# Patient Record
(demographics unavailable — no encounter records)

---

## 2024-10-28 NOTE — REVIEW OF SYSTEMS
[Diarrhea: Grade 0] : Diarrhea: Grade 0 [Muscle Weakness] : muscle weakness [Negative] : Allergic/Immunologic

## 2024-10-28 NOTE — HISTORY OF PRESENT ILLNESS
[Disease: _____________________] : Disease: [unfilled] [AJCC Stage: ____] : AJCC Stage: [unfilled] [Therapy: ___] : Therapy: [unfilled] [de-identified] : 68 M with poor medical follow up, HTN, untreated Hepatitis C presents for management of newly diagnosed cholagiocarcinoma, FGFR2 fusion.   Hong presented to [facility] on 8/10/23 with UTI symptoms, FTT with significant weight loss.  He repors 2-3 mos of progressive weakness, unintentional weight loss of 60 lbs in 2-3 yrs.  CT CAP 8/10/23 showed Large and ill-defined hepatic hypodensity involving a large portion of the right hepatic lobe, retroperitoneal lymphadenopathy. MRI Abd on 8/11/23 with findings are suspicious for multifocal intrahepatic cholangiocarcinoma, with the dominant mass in the right hepatic lobe measuring up to 11.8 cm. Retroperitoneal, periportal and gastric hepatic lymphadenopathy is favored to be metastatic, IR performed liver biopsy 8/14/23 c/w adenocarcinoma, moderately differentiated, IHC stains + CK7,  CK19. Patient found to be + hepatitis C.   PET/CT on 8/22/23  showed FDG-avid dominant lesion in right hepatic lobe and multiple small FDG-avid bilobar hepatic lesions correspond to biopsy-proven malignancy. FDG-avid retroperitoneal and inferior preesophageal lymphadenopathy. FDG-avid focus in upper rectum with questionable corresponding 2 cm soft tissue density on CT, is concerning for neoplasm. Nonspecific, mild hypermetabolism in fundus of stomach may reflect inflammation.   Referred to medical oncology.   9/8/23: Colonoscopy: large rectal polyp resected, benign 9/11/23: C1D1 Gemzar/Cisplatin/Durvalumab  9/18/23: C1D8 10/2/23:C2D1 10/9/23: C2D8 c/b cisplatin rxn with back pain, radiating to chest, hypertensive. Reaction occurred after Cisplatin infusion. Cisplatin d/c'ed 10/19/23: CT CAP: no change in hepatic lesions and RP nodes 10/23/23: C3D1 [place]/Durva alone 10/30/23 C3D8 discussed with pt, concern that Gemzar/Durva alone will not be enough to control disease. Recommendation to add Abraxane. Will d/c Durvalumab as no benefit known with combining with Abraxane.  11/13-1/22/24: C1-3D15 Gemzar/Abraxane 12/28/23: CT C/A/P - stable hepatic lesions and RP LAD 1/29/24: EGD: portal gastropathy 2/5-2/9/14: Admitted to [facility] with melena (2/5 tx held), found to have duodenal variceal bleed s/p obliteration. CT A/P 2/5/24 noted continued liver mass with satellite lesions, difficult to compare to prior scan 2/16/24: US Para: minimal ascites, 50 cc removed c/w portal HTN, neg cytology  3/4/24: resumed Gemzar 800 mg/m2 + Abraxane 100 mg/m2, C4D1 3/18/24: C4D15 4/1/24: C5D1 4/8/24: CT CAP: SD 4/15/24: C5D15 4/29/24: C6D1 5/31/24: CT CAP: incidental RUL segmental PE. Large infiltrative right hepatic lobe mass grossly stable. Multiple satellite nodules in the liver + periportal & retroperitoneal LAD stable. Increasing GB distension w/ increased RUQ & pericholecystic fluid.  5/31/24-6/2/24: Admitted for management of PE & GB distension. Started heparin > eliquis. TTE negative for R heart strain. Per surgery, no surgical intervention, treat w/ ABX  6/3/24: Started Futibatinib 12 mg 6/24/24: Increased Futibatinib 16 mg 7/23/24: futibatinib on hold d/t symptomatic hypotension  7/31/24: resumed futibatinib 12 mg po QD 8/29/24: CT CAP: interval decrease in overall tumor burden with right hepatic lobe, interval decrease in LAD. No new sites of diseas  [de-identified] : adenocarcinoma, moderately differentiated  [de-identified] : CHIO TMB 1   FGFR2 FGFR2-SORBS1 fusion IDH2 [id number] CBL C416S - subclonal SETD2 [id number] TERT promoter -[id number]> [de-identified] : Will be starting acupuncture and red light therapy  has been doing well  has been taking spirinolactone. Has not restarted lasix

## 2024-11-10 NOTE — REVIEW OF SYSTEMS
[Feeling Poorly] : feeling poorly [Recent Weight Loss (___ Lbs)] : recent [unfilled] ~Ulb weight loss [As Noted in HPI] : as noted in HPI [Negative] : Endocrine [Fever] : no fever [Chills] : no chills

## 2024-11-10 NOTE — ASSESSMENT
[FreeTextEntry1] : Mr. MARY KEARNS a 69-year-old  male, with poor medical follow-up, HTN, and untreated Hepatitis C, was recently diagnosed with cholangiocarcinoma by Dr. Hilda Kiser. MRI (August 11, 2023) found an 11.8 cm dominant mass in the right hepatic lobe, potential metastasis. Liver biopsy (August 14, 2023) confirmed moderately differentiated adenocarcinoma (CK7 and CK19 positive). PET/CT (August 22, 2023) showed FDG-avid lesions in the liver, lymph nodes, and upper rectum, with mild stomach fundus hypermetabolism. AFP <1.8 on August 10, 2023. The patient received a combination of Gemzar, Cisplatin, and Durvalumab initially, followed by Gemzar/Durvalumab alone, and later Gemzar/Abraxane due to concerns about disease control. Durvalumab was discontinued, and stable hepatic lesions were observed on subsequent scans, though a duodenal variceal bleed occurred during treatment. Course has been further complicated with ascites, s/p LVP x 1. Ascites now well controlled on diuretics. He feels well. He was started on Epclusa in Oct 2024 for HCV treatment.   PLAN # Chronic Hepatitis C: - I have discussed with him at length regarding hepatitis C. I reviewed the natural history, modes of transmission, evaluation, prognosis, and possible treatment. Genotype 2.  -Given improvements in his recent imaging studies, we started Epclusa for 24 week treatment. Will plan for repeat labs with Hep C RNA today and in 3 months before follow up.   #Variceal Screening/Duodenal ulcers -s/p EGD 5/20/2024: There is no endoscopic evidence of esophagitis, ulcerations or varices in the entire esophagus. Mild portal hypertensive gastropathy was found in the gastric fundus and in the gastric body. A large (> 5 mm) varix was found in the proximal second portion of the duodenum. It was 6 mm in diameter. The second distal varix which was injected with glue appears obliterated with no residual ulceration.  Plan for repeat in 1 year.   # Ascites: -Serum-ascites albumin gradient (SAAG) indicates portal hypertension. Ascitic fluid negative for malignant cells (Feb 16, 2024). Mr. KEARNS was counseled to adhere to a low sodium (<2 grams of sodium per day) diet by: avoiding adding any salt to meals (removing the salt shaker from the table); eliminating salty foods from his diet; eating more home-cooked meals; choosing fresh or frozen, not canned, vegetables and fruits; and reading ingredient and food labels to choose low sodium foods. -I have recommended continuing changing Lasix to 20 mg PRN given hypotensive episodes and to continue Aldactone 50 mg BID. Ascites clinically resolved.   #Hypotension -resolved   #Hepatic Encephalopathy -Patient is still taking Lactulose after most recent admission, HE symptoms have improved.  -We reviewed that the patient can self titrate dosing of Lactulose based on constipation symptoms, etc. He reports also taking Miralax and Colace to help treat constipation as well.   #Constipation -Continue Miralax / lactulose   # Multifocal Cholangiocarcinoma with metastases: -Management of the cholangiocarcinoma will be driven Dr. Kiser, Medical Oncologist.  -The patient received a combination of Gemzar, Cisplatin, and Durvalumab initially, followed by Gemzar/Durvalumab alone, and later Gemzar/Abraxane due to concerns about disease control. Durvalumab was discontinued, and stable hepatic lesions were observed on subsequent scans, though a duodenal variceal bleed occurred during treatment, and now treatment plan is being re-assessed. Further he developed ascites, s/p LVP x 1. Ascites now resolved on diuretics. He will follow up with Dr. Kiser with regards to CCA management.  -Recent MRI results showing  1.  Interval decrease in overall tumor burden within the right hepatic lobe. 2.  Interval decrease in lymphadenopathy. 3.  No new sites of disease.  -Per patient Dr. Kiser is ordering imaging studies q3 months.  -He was started on Entecavir to protect against HBV reactivation - this dose was increased due to recent decompensation and he is currently taking 1mg daily.    RTC in 3 months with repeat labs today and before RPA.   The patient was examined, and the treatment plan was reviewed in consultation with Dr. Mao.  Bushra Hensley, MSN, FNP-BC Transplant Hepatology Nurse Practitioner [facility] [address] [place] [id number] T: [phone] | F: [phone].

## 2024-11-10 NOTE — END OF VISIT
[] : Fellow [Time Spent: ___ minutes] : I have spent [unfilled] minutes of time on the encounter which excludes teaching and separately reported services. [FreeTextEntry3] : I saw and evaluated the patient with NP Ayden.  I discussed the care of this patient with the NP providing the service, and was directly responsible for the patient's management. My discussion with the NP included the patient's history, physical exam, laboratory findings, and medical decision-making. I agree with the documented findings and plan of care of the NP's note. Spent > 30 minutes collectively in reviewing records, performing patient history and physical examination, and formulating recommendations/plan of care.

## 2024-11-10 NOTE — ASSESSMENT
[FreeTextEntry1] : Mr. MARY KEARNS a 69-year-old  male, with poor medical follow-up, HTN, and untreated Hepatitis C, was recently diagnosed with cholangiocarcinoma by Dr. Hilda Kiser. MRI (August 11, 2023) found an 11.8 cm dominant mass in the right hepatic lobe, potential metastasis. Liver biopsy (August 14, 2023) confirmed moderately differentiated adenocarcinoma (CK7 and CK19 positive). PET/CT (August 22, 2023) showed FDG-avid lesions in the liver, lymph nodes, and upper rectum, with mild stomach fundus hypermetabolism. AFP <1.8 on August 10, 2023. The patient received a combination of Gemzar, Cisplatin, and Durvalumab initially, followed by Gemzar/Durvalumab alone, and later Gemzar/Abraxane due to concerns about disease control. Durvalumab was discontinued, and stable hepatic lesions were observed on subsequent scans, though a duodenal variceal bleed occurred during treatment. Course has been further complicated with ascites, s/p LVP x 1. Ascites now well controlled on diuretics. He feels well. He was started on Epclusa in Oct 2024 for HCV treatment.   PLAN # Chronic Hepatitis C: - I have discussed with him at length regarding hepatitis C. I reviewed the natural history, modes of transmission, evaluation, prognosis, and possible treatment. Genotype 2.  -Given improvements in his recent imaging studies, we started Epclusa for 24 week treatment. Will plan for repeat labs with Hep C RNA today and in 3 months before follow up.   #Variceal Screening/Duodenal ulcers -s/p EGD 5/20/2024: There is no endoscopic evidence of esophagitis, ulcerations or varices in the entire esophagus. Mild portal hypertensive gastropathy was found in the gastric fundus and in the gastric body. A large (> 5 mm) varix was found in the proximal second portion of the duodenum. It was 6 mm in diameter. The second distal varix which was injected with glue appears obliterated with no residual ulceration.  Plan for repeat in 1 year.   # Ascites: -Serum-ascites albumin gradient (SAAG) indicates portal hypertension. Ascitic fluid negative for malignant cells (Feb 16, 2024). Mr. KEARNS was counseled to adhere to a low sodium (<2 grams of sodium per day) diet by: avoiding adding any salt to meals (removing the salt shaker from the table); eliminating salty foods from his diet; eating more home-cooked meals; choosing fresh or frozen, not canned, vegetables and fruits; and reading ingredient and food labels to choose low sodium foods. -I have recommended continuing changing Lasix to 20 mg PRN given hypotensive episodes and to continue Aldactone 50 mg BID. Ascites clinically resolved.   #Hypotension -resolved   #Hepatic Encephalopathy -Patient is still taking Lactulose after most recent admission, HE symptoms have improved.  -We reviewed that the patient can self titrate dosing of Lactulose based on constipation symptoms, etc. He reports also taking Miralax and Colace to help treat constipation as well.   #Constipation -Continue Miralax / lactulose   # Multifocal Cholangiocarcinoma with metastases: -Management of the cholangiocarcinoma will be driven Dr. Kiser, Medical Oncologist.  -The patient received a combination of Gemzar, Cisplatin, and Durvalumab initially, followed by Gemzar/Durvalumab alone, and later Gemzar/Abraxane due to concerns about disease control. Durvalumab was discontinued, and stable hepatic lesions were observed on subsequent scans, though a duodenal variceal bleed occurred during treatment, and now treatment plan is being re-assessed. Further he developed ascites, s/p LVP x 1. Ascites now resolved on diuretics. He will follow up with Dr. Kiser with regards to CCA management.  -Recent MRI results showing  1.  Interval decrease in overall tumor burden within the right hepatic lobe. 2.  Interval decrease in lymphadenopathy. 3.  No new sites of disease.  -Per patient Dr. Kiser is ordering imaging studies q3 months.  -He was started on Entecavir to protect against HBV reactivation - this dose was increased due to recent decompensation and he is currently taking 1mg daily.    RTC in 3 months with repeat labs today and before RPA.   The patient was examined, and the treatment plan was reviewed in consultation with Dr. Mao.  Bushra Hensley, MSN, FNP-BC Transplant Hepatology Nurse Practitioner [facility] [address] [place] [id number] T: [phone] | F: [phone].

## 2024-11-10 NOTE — PHYSICAL EXAM
[General Appearance - Alert] : alert [General Appearance - In No Acute Distress] : in no acute distress [Sclera] : the sclera and conjunctiva were normal [PERRL With Normal Accommodation] : pupils were equal in size, round, and reactive to light [Extraocular Movements] : extraocular movements were intact [Outer Ear] : the ears and nose were normal in appearance [Oropharynx] : the oropharynx was normal [Neck Appearance] : the appearance of the neck was normal [Neck Cervical Mass (___cm)] : no neck mass was observed [Jugular Venous Distention Increased] : there was no jugular-venous distention [Thyroid Diffuse Enlargement] : the thyroid was not enlarged [Thyroid Nodule] : there were no palpable thyroid nodules [] : no respiratory distress [Auscultation Breath Sounds / Voice Sounds] : lungs were clear to auscultation bilaterally [Heart Rate And Rhythm] : heart rate was normal and rhythm regular [Heart Sounds] : normal S1 and S2 [Heart Sounds Gallop] : no gallops [Murmurs] : no murmurs [Heart Sounds Pericardial Friction Rub] : no pericardial rub [Bowel Sounds] : normal bowel sounds [Abdomen Soft] : soft [Abdomen Tenderness] : non-tender [Abdomen Mass (___ Cm)] : no abdominal mass palpated [Abnormal Walk] : normal gait [Nail Clubbing] : no clubbing  or cyanosis of the fingernails [Musculoskeletal - Swelling] : no joint swelling seen [Motor Tone] : muscle strength and tone were normal [Deep Tendon Reflexes (DTR)] : deep tendon reflexes were 2+ and symmetric [Sensation] : the sensory exam was normal to light touch and pinprick [No Focal Deficits] : no focal deficits [Oriented To Time, Place, And Person] : oriented to person, place, and time [Impaired Insight] : insight and judgment were intact [Affect] : the affect was normal [FreeTextEntry1] : Liver 2-3 cm under the RCM, No ascites

## 2024-11-10 NOTE — HISTORY OF PRESENT ILLNESS
[FreeTextEntry1] : Mr. HONG KEARNS a 69 year White male  presents today for  a hepatology appointment. [facility] and has been referred to us by WARREN KISER.  He had a history of poor medical follow-up, hypertension (HTN), and untreated Hepatitis C (incidentally detected, HCV Viral Load: 482475 IU/mL), recently diagnosed with cholangiocarcinoma. Of note, his wife has hepatitis C and was treated a long time ago, and apparently was cured.  Hong's recent medical journey began when he presented to [facility] on August 10, 2023, complaining of symptoms suggestive of a urinary tract infection (UTI). Notably, he had experienced significant weight loss and failure to thrive. He reported a progressive weakening of his overall health over the past 2-3 months, accompanied by an unintentional weight loss of a staggering 60 pounds within the last 2-3 years.  A CT scan of the chest, abdomen, and pelvis conducted on August 10, 2023, revealed concerning findings. There was a large and ill-defined hypodensity involving a substantial portion of the right hepatic lobe, accompanied by retroperitoneal lymphadenopathy.  Further imaging, an MRI of the abdomen on August 11, 2023, raised suspicion of multifocal intrahepatic cholangiocarcinoma, with the dominant mass measuring up to 11.8 cm in the right hepatic lobe. Additionally, there was evidence of retroperitoneal, periportal, and gastric hepatic lymphadenopathy, suggesting possible metastatic spread. To confirm the diagnosis, an interventional radiologist performed a liver biopsy on August 14, 2023, which was consistent with adenocarcinoma, moderately differentiated. Immunohistochemistry (IHC) stains were positive for CK7 and CK19. It was also discovered that Hong had a positive test for hepatitis C.  Subsequently, a PET/CT scan on August 22, 2023, revealed a dominant FDG-avid lesion in the right hepatic lobe, along with multiple smaller FDG-avid bilobar hepatic lesions, corresponding to the biopsy-proven malignancy. FDG-avid retroperitoneal and inferior preesophageal lymphadenopathy were also observed. Furthermore, there was a focus of FDG avidity in the upper rectum, along with a 2 cm soft tissue density on CT, raising concerns about a possible neoplasm. Additionally, there was nonspecific mild hypermetabolism in the fundus of the stomach, which may reflect inflammation.  Tumor Markers: AFP <1.8 on August 10, 2023  A recent flex sigmoidoscopy revealed the presence of polyps, which were biopsied, with pathology results currently pending. He is awaiting a formal full length colonoscopy through his colorectal surgeon.   Feb 21, 2024: Between October 19, 2023, and December 28, 2023, CT scans showed no significant change in the hepatic lesions or lymphadenopathy for this patient with known cholangiocarcinoma. There was also no change in the previously noted splenomegaly. The patient had an elective esophagogastroduodenoscopy (EGD) that revealed no esophageal varices but did show signs of portal hypertensive gastropathy and two small nodules in the duodenum, one of which was biopsied. There was no active bleeding observed immediately after the biopsy. However, about a week later, the patient experienced hematemesis and melena. A follow-up EGD raised suspicions of duodenal varices at the prior biopsy site. A subsequent CT scan with IV contrast confirmed the presence of varices and also detected new ascites. Treatment for the duodenal varices included another EGD, where glue injection successfully controlled the bleeding. Despite this, the patient's ascites increased, leading to a large-volume paracentesis (LVP) on February 16, 2024, during which 2.5 liters of fluid were removed. This procedure provided temporary relief, but the ascites quickly reaccumulated. Fluid analysis and cytology results are pending, but the serum-ascites albumin gradient (SAAG) indicates portal hypertension. I have recommended starting Lasix 40 mg and Aldactone 100 mg, with a follow-up lab test also ordered. We thoroughly discussed his long-term prognosis, considering the complexity of his condition, which likely includes cirrhosis, portal hypertension, decompensation with ascites, ongoing cholangiocarcinoma, and untreated hepatitis C virus (HCV) infection. I have advised a follow up EGD in about 30 days from the last one.  Feb 28, 2024: He returned for a follow-up and is generally feeling better, though he experienced some intermittent abdominal pain and nausea, which have now resolved. Ascites resolved clinically on diuretics.  Dr. Kiser has increased his Protonix dosage to 40 mg twice daily and started him on Famotidine 20 mg twice daily. His labs from February 21, 2024, show stable liver function tests with an AST of 45, ALT of 10, ALP of 142, and bilirubin at 0.5. His hemoglobin level is 10.5, which remains stable, and there has been no interval gastrointestinal bleeding (he denies any occurrence of black stools).  March 21, 2024: Patient returns for a follow up. Doing overall well. No interval GI bleeding. Patient is this is significantly improved with diuretics currently on Lasix 40 mg daily and Aldactone 100 mg daily.  He has resumed Gemzar 800 mg/m along with Abraxane 100 mg/m.  The dose was apparently reduced due to patient's ongoing fatigue and portal hypertension.  Patient has been taking marijuana for symptoms of nausea.  He has been referred by oncology to palliative care for medical marijuana certification to help symptom management.  We reviewed blood test results from March 18, 2024.  This revealed mildly elevated liver test with AST 54, ALT 50, alkaline phosphatase 140 units/L.  Total bili 1.6 mg/dL.  This is relatively stable compared to prior labs.  His CA 19-9 is 9 level is 77 (down from 81).  CBC shows a hemoglobin of 11.1 g/dL which is stable.  Platelet counts are 248,000.  He was scheduled for an upper endoscopy in March 18, 2024 but on the same day chemotherapy was scheduled so the EGD will be rescheduled.  June 11, 2024: Patient returns for routine follow-up. He is s/p hospitalization from 5/31-6/2 for follow up workup for acute cholecystitis based on outpatient CT Abd findings - workup was negative, however he was incidentally found to have a PE. He was initially started on AC management with Lovenox but now transitioned to Eliquis. He was also found to have hepatic encephalopathy on admission and was started on Lactulose. He was transitioned to oral chemo (Futibatinib) this week after discharge. As per the daughter, since starting chemo he has become more constipated despite taking Lactulose as ordered. The patient's daughter reports his last para was on 5/8/24 - removed 1750mL.  He continues to complain of peripheral neuropathy, was previously treated with Cymbalta but caused worsening fatigue. He currently denies abdominal pain, N/V/C/D.   July 15, 2024: Patient returns for routine follow up. He states that since starting Midodrine and recent PHos binder to improve phosphorus he has felt more nauseous, reports decrease in appetite, denies abdominal pain, C/D. BP during office visit today is still borderline hypotension : 101/5. Patient recently called office with c/o hypotension in which Dr Mao initiated Midodrine 5mg TID. No adjustments were made to diuretic regimen: he continues to remain on Furosemide 40mg daily and Spirinolactone 100mg daily.   August 14, 2024:  The patient is here for a follow-up visit and is otherwise doing well with no acute complaints. His previous issues with hypotension have resolved. He recently saw Dr. Kiser, and his labs show a downtrending CA 19-9. Overall, lab results are stable. He is back on his diuretics, Lasix 40 mg daily, and reduced dose of Aldactone 50 mg (down from 100 mg) and Midodrine.   Sept 18, 2024: The patient presents today reporting chemotherapy-related side effects, including pain in his feet and toes due to neuropathy. He also feels that his joint pain has worsened since starting chemotherapy. He denies any active gastrointestinal complaints but mentions ongoing constipation. He is interested in getting his HCV treated.  Nov 6, 2024: The patient returns for a routine follow-up, accompanied by his daughter. He has been on Epclusa for the past month and is tolerating it well. He reports feeling well overall and denies any abdominal pain, nausea, vomiting, constipation, or diarrhea. We reviewed lab results from October 28, which showed normal liver function tests and an improvement in CA 19-9.

## 2024-11-10 NOTE — HISTORY OF PRESENT ILLNESS
[FreeTextEntry1] : Mr. HONG KEARNS a 69 year White male  presents today for  a hepatology appointment. [facility] and has been referred to us by WARREN KISER.  He had a history of poor medical follow-up, hypertension (HTN), and untreated Hepatitis C (incidentally detected, HCV Viral Load: 234695 IU/mL), recently diagnosed with cholangiocarcinoma. Of note, his wife has hepatitis C and was treated a long time ago, and apparently was cured.  Hong's recent medical journey began when he presented to [facility] on August 10, 2023, complaining of symptoms suggestive of a urinary tract infection (UTI). Notably, he had experienced significant weight loss and failure to thrive. He reported a progressive weakening of his overall health over the past 2-3 months, accompanied by an unintentional weight loss of a staggering 60 pounds within the last 2-3 years.  A CT scan of the chest, abdomen, and pelvis conducted on August 10, 2023, revealed concerning findings. There was a large and ill-defined hypodensity involving a substantial portion of the right hepatic lobe, accompanied by retroperitoneal lymphadenopathy.  Further imaging, an MRI of the abdomen on August 11, 2023, raised suspicion of multifocal intrahepatic cholangiocarcinoma, with the dominant mass measuring up to 11.8 cm in the right hepatic lobe. Additionally, there was evidence of retroperitoneal, periportal, and gastric hepatic lymphadenopathy, suggesting possible metastatic spread. To confirm the diagnosis, an interventional radiologist performed a liver biopsy on August 14, 2023, which was consistent with adenocarcinoma, moderately differentiated. Immunohistochemistry (IHC) stains were positive for CK7 and CK19. It was also discovered that Hong had a positive test for hepatitis C.  Subsequently, a PET/CT scan on August 22, 2023, revealed a dominant FDG-avid lesion in the right hepatic lobe, along with multiple smaller FDG-avid bilobar hepatic lesions, corresponding to the biopsy-proven malignancy. FDG-avid retroperitoneal and inferior preesophageal lymphadenopathy were also observed. Furthermore, there was a focus of FDG avidity in the upper rectum, along with a 2 cm soft tissue density on CT, raising concerns about a possible neoplasm. Additionally, there was nonspecific mild hypermetabolism in the fundus of the stomach, which may reflect inflammation.  Tumor Markers: AFP <1.8 on August 10, 2023  A recent flex sigmoidoscopy revealed the presence of polyps, which were biopsied, with pathology results currently pending. He is awaiting a formal full length colonoscopy through his colorectal surgeon.   Feb 21, 2024: Between October 19, 2023, and December 28, 2023, CT scans showed no significant change in the hepatic lesions or lymphadenopathy for this patient with known cholangiocarcinoma. There was also no change in the previously noted splenomegaly. The patient had an elective esophagogastroduodenoscopy (EGD) that revealed no esophageal varices but did show signs of portal hypertensive gastropathy and two small nodules in the duodenum, one of which was biopsied. There was no active bleeding observed immediately after the biopsy. However, about a week later, the patient experienced hematemesis and melena. A follow-up EGD raised suspicions of duodenal varices at the prior biopsy site. A subsequent CT scan with IV contrast confirmed the presence of varices and also detected new ascites. Treatment for the duodenal varices included another EGD, where glue injection successfully controlled the bleeding. Despite this, the patient's ascites increased, leading to a large-volume paracentesis (LVP) on February 16, 2024, during which 2.5 liters of fluid were removed. This procedure provided temporary relief, but the ascites quickly reaccumulated. Fluid analysis and cytology results are pending, but the serum-ascites albumin gradient (SAAG) indicates portal hypertension. I have recommended starting Lasix 40 mg and Aldactone 100 mg, with a follow-up lab test also ordered. We thoroughly discussed his long-term prognosis, considering the complexity of his condition, which likely includes cirrhosis, portal hypertension, decompensation with ascites, ongoing cholangiocarcinoma, and untreated hepatitis C virus (HCV) infection. I have advised a follow up EGD in about 30 days from the last one.  Feb 28, 2024: He returned for a follow-up and is generally feeling better, though he experienced some intermittent abdominal pain and nausea, which have now resolved. Ascites resolved clinically on diuretics.  Dr. Kiser has increased his Protonix dosage to 40 mg twice daily and started him on Famotidine 20 mg twice daily. His labs from February 21, 2024, show stable liver function tests with an AST of 45, ALT of 10, ALP of 142, and bilirubin at 0.5. His hemoglobin level is 10.5, which remains stable, and there has been no interval gastrointestinal bleeding (he denies any occurrence of black stools).  March 21, 2024: Patient returns for a follow up. Doing overall well. No interval GI bleeding. Patient is this is significantly improved with diuretics currently on Lasix 40 mg daily and Aldactone 100 mg daily.  He has resumed Gemzar 800 mg/m along with Abraxane 100 mg/m.  The dose was apparently reduced due to patient's ongoing fatigue and portal hypertension.  Patient has been taking marijuana for symptoms of nausea.  He has been referred by oncology to palliative care for medical marijuana certification to help symptom management.  We reviewed blood test results from March 18, 2024.  This revealed mildly elevated liver test with AST 54, ALT 50, alkaline phosphatase 140 units/L.  Total bili 1.6 mg/dL.  This is relatively stable compared to prior labs.  His CA 19-9 is 9 level is 77 (down from 81).  CBC shows a hemoglobin of 11.1 g/dL which is stable.  Platelet counts are 248,000.  He was scheduled for an upper endoscopy in March 18, 2024 but on the same day chemotherapy was scheduled so the EGD will be rescheduled.  June 11, 2024: Patient returns for routine follow-up. He is s/p hospitalization from 5/31-6/2 for follow up workup for acute cholecystitis based on outpatient CT Abd findings - workup was negative, however he was incidentally found to have a PE. He was initially started on AC management with Lovenox but now transitioned to Eliquis. He was also found to have hepatic encephalopathy on admission and was started on Lactulose. He was transitioned to oral chemo (Futibatinib) this week after discharge. As per the daughter, since starting chemo he has become more constipated despite taking Lactulose as ordered. The patient's daughter reports his last para was on 5/8/24 - removed 1750mL.  He continues to complain of peripheral neuropathy, was previously treated with Cymbalta but caused worsening fatigue. He currently denies abdominal pain, N/V/C/D.   July 15, 2024: Patient returns for routine follow up. He states that since starting Midodrine and recent PHos binder to improve phosphorus he has felt more nauseous, reports decrease in appetite, denies abdominal pain, C/D. BP during office visit today is still borderline hypotension : 101/5. Patient recently called office with c/o hypotension in which Dr Mao initiated Midodrine 5mg TID. No adjustments were made to diuretic regimen: he continues to remain on Furosemide 40mg daily and Spirinolactone 100mg daily.   August 14, 2024:  The patient is here for a follow-up visit and is otherwise doing well with no acute complaints. His previous issues with hypotension have resolved. He recently saw Dr. Kiser, and his labs show a downtrending CA 19-9. Overall, lab results are stable. He is back on his diuretics, Lasix 40 mg daily, and reduced dose of Aldactone 50 mg (down from 100 mg) and Midodrine.   Sept 18, 2024: The patient presents today reporting chemotherapy-related side effects, including pain in his feet and toes due to neuropathy. He also feels that his joint pain has worsened since starting chemotherapy. He denies any active gastrointestinal complaints but mentions ongoing constipation. He is interested in getting his HCV treated.  Nov 6, 2024: The patient returns for a routine follow-up, accompanied by his daughter. He has been on Epclusa for the past month and is tolerating it well. He reports feeling well overall and denies any abdominal pain, nausea, vomiting, constipation, or diarrhea. We reviewed lab results from October 28, which showed normal liver function tests and an improvement in CA 19-9.

## 2024-11-10 NOTE — ASSESSMENT
[FreeTextEntry1] : Mr. MARY KEARNS a 69-year-old  male, with poor medical follow-up, HTN, and untreated Hepatitis C, was recently diagnosed with cholangiocarcinoma by Dr. Hilda Kiser. MRI (August 11, 2023) found an 11.8 cm dominant mass in the right hepatic lobe, potential metastasis. Liver biopsy (August 14, 2023) confirmed moderately differentiated adenocarcinoma (CK7 and CK19 positive). PET/CT (August 22, 2023) showed FDG-avid lesions in the liver, lymph nodes, and upper rectum, with mild stomach fundus hypermetabolism. AFP <1.8 on August 10, 2023. The patient received a combination of Gemzar, Cisplatin, and Durvalumab initially, followed by Gemzar/Durvalumab alone, and later Gemzar/Abraxane due to concerns about disease control. Durvalumab was discontinued, and stable hepatic lesions were observed on subsequent scans, though a duodenal variceal bleed occurred during treatment. Course has been further complicated with ascites, s/p LVP x 1. Ascites now well controlled on diuretics. He feels well. He was started on Epclusa in Oct 2024 for HCV treatment.   PLAN # Chronic Hepatitis C: - I have discussed with him at length regarding hepatitis C. I reviewed the natural history, modes of transmission, evaluation, prognosis, and possible treatment. Genotype 2.  -Given improvements in his recent imaging studies, we started Epclusa for 24 week treatment. Will plan for repeat labs with Hep C RNA today and in 3 months before follow up.   #Variceal Screening/Duodenal ulcers -s/p EGD 5/20/2024: There is no endoscopic evidence of esophagitis, ulcerations or varices in the entire esophagus. Mild portal hypertensive gastropathy was found in the gastric fundus and in the gastric body. A large (> 5 mm) varix was found in the proximal second portion of the duodenum. It was 6 mm in diameter. The second distal varix which was injected with glue appears obliterated with no residual ulceration.  Plan for repeat in 1 year.   # Ascites: -Serum-ascites albumin gradient (SAAG) indicates portal hypertension. Ascitic fluid negative for malignant cells (Feb 16, 2024). Mr. KEARNS was counseled to adhere to a low sodium (<2 grams of sodium per day) diet by: avoiding adding any salt to meals (removing the salt shaker from the table); eliminating salty foods from his diet; eating more home-cooked meals; choosing fresh or frozen, not canned, vegetables and fruits; and reading ingredient and food labels to choose low sodium foods. -I have recommended continuing changing Lasix to 20 mg PRN given hypotensive episodes and to continue Aldactone 50 mg BID. Ascites clinically resolved.   #Hypotension -resolved   #Hepatic Encephalopathy -Patient is still taking Lactulose after most recent admission, HE symptoms have improved.  -We reviewed that the patient can self titrate dosing of Lactulose based on constipation symptoms, etc. He reports also taking Miralax and Colace to help treat constipation as well.   #Constipation -Continue Miralax / lactulose   # Multifocal Cholangiocarcinoma with metastases: -Management of the cholangiocarcinoma will be driven Dr. Kiser, Medical Oncologist.  -The patient received a combination of Gemzar, Cisplatin, and Durvalumab initially, followed by Gemzar/Durvalumab alone, and later Gemzar/Abraxane due to concerns about disease control. Durvalumab was discontinued, and stable hepatic lesions were observed on subsequent scans, though a duodenal variceal bleed occurred during treatment, and now treatment plan is being re-assessed. Further he developed ascites, s/p LVP x 1. Ascites now resolved on diuretics. He will follow up with Dr. Kiser with regards to CCA management.  -Recent MRI results showing  1.  Interval decrease in overall tumor burden within the right hepatic lobe. 2.  Interval decrease in lymphadenopathy. 3.  No new sites of disease.  -Per patient Dr. Kiser is ordering imaging studies q3 months.  -He was started on Entecavir to protect against HBV reactivation - this dose was increased due to recent decompensation and he is currently taking 1mg daily.    RTC in 3 months with repeat labs today and before RPA.   The patient was examined, and the treatment plan was reviewed in consultation with Dr. Mao.  Bushra Hensley, MSN, FNP-BC Transplant Hepatology Nurse Practitioner [facility] [address] [place] [id number] T: [phone] | F: [phone].

## 2024-11-10 NOTE — HISTORY OF PRESENT ILLNESS
[FreeTextEntry1] : Mr. HONG KEARNS a 69 year White male  presents today for  a hepatology appointment. [facility] a [facility] and has been referred to us by WARREN KISER.  He had a history of poor medical follow-up, hypertension (HTN), and untreated Hepatitis C (incidentally detected, HCV Viral Load: 027742 IU/mL), recently diagnosed with cholangiocarcinoma. Of note, his wife has hepatitis C and was treated a long time ago, and apparently was cured.  Hong's recent medical journey began when he presented to [facility] on August 10, 2023, complaining of symptoms suggestive of a urinary tract infection (UTI). Notably, he had experienced significant weight loss and failure to thrive. He reported a progressive weakening of his overall health over the past 2-3 months, accompanied by an unintentional weight loss of a staggering 60 pounds within the last 2-3 years.  A CT scan of the chest, abdomen, and pelvis conducted on August 10, 2023, revealed concerning findings. There was a large and ill-defined hypodensity involving a substantial portion of the right hepatic lobe, accompanied by retroperitoneal lymphadenopathy.  Further imaging, an MRI of the abdomen on August 11, 2023, raised suspicion of multifocal intrahepatic cholangiocarcinoma, with the dominant mass measuring up to 11.8 cm in the right hepatic lobe. Additionally, there was evidence of retroperitoneal, periportal, and gastric hepatic lymphadenopathy, suggesting possible metastatic spread. To confirm the diagnosis, an interventional radiologist performed a liver biopsy on August 14, 2023, which was consistent with adenocarcinoma, moderately differentiated. Immunohistochemistry (IHC) stains were positive for CK7 and CK19. It was also discovered that Hong had a positive test for hepatitis C.  Subsequently, a PET/CT scan on August 22, 2023, revealed a dominant FDG-avid lesion in the right hepatic lobe, along with multiple smaller FDG-avid bilobar hepatic lesions, corresponding to the biopsy-proven malignancy. FDG-avid retroperitoneal and inferior preesophageal lymphadenopathy were also observed. Furthermore, there was a focus of FDG avidity in the upper rectum, along with a 2 cm soft tissue density on CT, raising concerns about a possible neoplasm. Additionally, there was nonspecific mild hypermetabolism in the fundus of the stomach, which may reflect inflammation.  Tumor Markers: AFP <1.8 on August 10, 2023  A recent flex sigmoidoscopy revealed the presence of polyps, which were biopsied, with pathology results currently pending. He is awaiting a formal full length colonoscopy through his colorectal surgeon.   Feb 21, 2024: Between October 19, 2023, and December 28, 2023, CT scans showed no significant change in the hepatic lesions or lymphadenopathy for this patient with known cholangiocarcinoma. There was also no change in the previously noted splenomegaly. The patient had an elective esophagogastroduodenoscopy (EGD) that revealed no esophageal varices but did show signs of portal hypertensive gastropathy and two small nodules in the duodenum, one of which was biopsied. There was no active bleeding observed immediately after the biopsy. However, about a week later, the patient experienced hematemesis and melena. A follow-up EGD raised suspicions of duodenal varices at the prior biopsy site. A subsequent CT scan with IV contrast confirmed the presence of varices and also detected new ascites. Treatment for the duodenal varices included another EGD, where glue injection successfully controlled the bleeding. Despite this, the patient's ascites increased, leading to a large-volume paracentesis (LVP) on February 16, 2024, during which 2.5 liters of fluid were removed. This procedure provided temporary relief, but the ascites quickly reaccumulated. Fluid analysis and cytology results are pending, but the serum-ascites albumin gradient (SAAG) indicates portal hypertension. I have recommended starting Lasix 40 mg and Aldactone 100 mg, with a follow-up lab test also ordered. We thoroughly discussed his long-term prognosis, considering the complexity of his condition, which likely includes cirrhosis, portal hypertension, decompensation with ascites, ongoing cholangiocarcinoma, and untreated hepatitis C virus (HCV) infection. I have advised a follow up EGD in about 30 days from the last one.  Feb 28, 2024: He returned for a follow-up and is generally feeling better, though he experienced some intermittent abdominal pain and nausea, which have now resolved. Ascites resolved clinically on diuretics.  Dr. Kiser has increased his Protonix dosage to 40 mg twice daily and started him on Famotidine 20 mg twice daily. His labs from February 21, 2024, show stable liver function tests with an AST of 45, ALT of 10, ALP of 142, and bilirubin at 0.5. His hemoglobin level is 10.5, which remains stable, and there has been no interval gastrointestinal bleeding (he denies any occurrence of black stools).  March 21, 2024: Patient returns for a follow up. Doing overall well. No interval GI bleeding. Patient is this is significantly improved with diuretics currently on Lasix 40 mg daily and Aldactone 100 mg daily.  He has resumed Gemzar 800 mg/m along with Abraxane 100 mg/m.  The dose was apparently reduced due to patient's ongoing fatigue and portal hypertension.  Patient has been taking marijuana for symptoms of nausea.  He has been referred by oncology to palliative care for medical marijuana certification to help symptom management.  We reviewed blood test results from March 18, 2024.  This revealed mildly elevated liver test with AST 54, ALT 50, alkaline phosphatase 140 units/L.  Total bili 1.6 mg/dL.  This is relatively stable compared to prior labs.  His CA 19-9 is 9 level is 77 (down from 81).  CBC shows a hemoglobin of 11.1 g/dL which is stable.  Platelet counts are 248,000.  He was scheduled for an upper endoscopy in March 18, 2024 but on the same day chemotherapy was scheduled so the EGD will be rescheduled.  June 11, 2024: Patient returns for routine follow-up. He is s/p hospitalization from 5/31-6/2 for follow up workup for acute cholecystitis based on outpatient CT Abd findings - workup was negative, however he was incidentally found to have a PE. He was initially started on AC management with Lovenox but now transitioned to Eliquis. He was also found to have hepatic encephalopathy on admission and was started on Lactulose. He was transitioned to oral chemo (Futibatinib) this week after discharge. As per the daughter, since starting chemo he has become more constipated despite taking Lactulose as ordered. The patient's daughter reports his last para was on 5/8/24 - removed 1750mL.  He continues to complain of peripheral neuropathy, was previously treated with Cymbalta but caused worsening fatigue. He currently denies abdominal pain, N/V/C/D.   July 15, 2024: Patient returns for routine follow up. He states that since starting Midodrine and recent PHos binder to improve phosphorus he has felt more nauseous, reports decrease in appetite, denies abdominal pain, C/D. BP during office visit today is still borderline hypotension : 101/5. Patient recently called office with c/o hypotension in which Dr Mao initiated Midodrine 5mg TID. No adjustments were made to diuretic regimen: he continues to remain on Furosemide 40mg daily and Spirinolactone 100mg daily.   August 14, 2024:  The patient is here for a follow-up visit and is otherwise doing well with no acute complaints. His previous issues with hypotension have resolved. He recently saw Dr. Kiser, and his labs show a downtrending CA 19-9. Overall, lab results are stable. He is back on his diuretics, Lasix 40 mg daily, and reduced dose of Aldactone 50 mg (down from 100 mg) and Midodrine.   Sept 18, 2024: The patient presents today reporting chemotherapy-related side effects, including pain in his feet and toes due to neuropathy. He also feels that his joint pain has worsened since starting chemotherapy. He denies any active gastrointestinal complaints but mentions ongoing constipation. He is interested in getting his HCV treated.  Nov 6, 2024: The patient returns for a routine follow-up, accompanied by his daughter. He has been on Epclusa for the past month and is tolerating it well. He reports feeling well overall and denies any abdominal pain, nausea, vomiting, constipation, or diarrhea. We reviewed lab results from October 28, which showed normal liver function tests and an improvement in CA 19-9.

## 2024-11-10 NOTE — ASSESSMENT
[FreeTextEntry1] : Mr. MARY KEARNS a 69-year-old  male, with poor medical follow-up, HTN, and untreated Hepatitis C, was recently diagnosed with cholangiocarcinoma by Dr. Hilda Kiser. MRI (August 11, 2023) found an 11.8 cm dominant mass in the right hepatic lobe, potential metastasis. Liver biopsy (August 14, 2023) confirmed moderately differentiated adenocarcinoma (CK7 and CK19 positive). PET/CT (August 22, 2023) showed FDG-avid lesions in the liver, lymph nodes, and upper rectum, with mild stomach fundus hypermetabolism. AFP <1.8 on August 10, 2023. The patient received a combination of Gemzar, Cisplatin, and Durvalumab initially, followed by Gemzar/Durvalumab alone, and later Gemzar/Abraxane due to concerns about disease control. Durvalumab was discontinued, and stable hepatic lesions were observed on subsequent scans, though a duodenal variceal bleed occurred during treatment. Course has been further complicated with ascites, s/p LVP x 1. Ascites now well controlled on diuretics. He feels well. He was started on Epclusa in Oct 2024 for HCV treatment.   PLAN # Chronic Hepatitis C: - I have discussed with him at length regarding hepatitis C. I reviewed the natural history, modes of transmission, evaluation, prognosis, and possible treatment. Genotype 2.  -Given improvements in his recent imaging studies, we started Epclusa for 24 week treatment. Will plan for repeat labs with Hep C RNA today and in 3 months before follow up.   #Variceal Screening/Duodenal ulcers -s/p EGD 5/20/2024: There is no endoscopic evidence of esophagitis, ulcerations or varices in the entire esophagus. Mild portal hypertensive gastropathy was found in the gastric fundus and in the gastric body. A large (> 5 mm) varix was found in the proximal second portion of the duodenum. It was 6 mm in diameter. The second distal varix which was injected with glue appears obliterated with no residual ulceration.  Plan for repeat in 1 year.   # Ascites: -Serum-ascites albumin gradient (SAAG) indicates portal hypertension. Ascitic fluid negative for malignant cells (Feb 16, 2024). Mr. KEARNS was counseled to adhere to a low sodium (<2 grams of sodium per day) diet by: avoiding adding any salt to meals (removing the salt shaker from the table); eliminating salty foods from his diet; eating more home-cooked meals; choosing fresh or frozen, not canned, vegetables and fruits; and reading ingredient and food labels to choose low sodium foods. -I have recommended continuing changing Lasix to 20 mg PRN given hypotensive episodes and to continue Aldactone 50 mg BID. Ascites clinically resolved.   #Hypotension -resolved   #Hepatic Encephalopathy -Patient is still taking Lactulose after most recent admission, HE symptoms have improved.  -We reviewed that the patient can self titrate dosing of Lactulose based on constipation symptoms, etc. He reports also taking Miralax and Colace to help treat constipation as well.   #Constipation -Continue Miralax / lactulose   # Multifocal Cholangiocarcinoma with metastases: -Management of the cholangiocarcinoma will be driven Dr. Kiser, Medical Oncologist.  -The patient received a combination of Gemzar, Cisplatin, and Durvalumab initially, followed by Gemzar/Durvalumab alone, and later Gemzar/Abraxane due to concerns about disease control. Durvalumab was discontinued, and stable hepatic lesions were observed on subsequent scans, though a duodenal variceal bleed occurred during treatment, and now treatment plan is being re-assessed. Further he developed ascites, s/p LVP x 1. Ascites now resolved on diuretics. He will follow up with Dr. Kiser with regards to CCA management.  -Recent MRI results showing  1.  Interval decrease in overall tumor burden within the right hepatic lobe. 2.  Interval decrease in lymphadenopathy. 3.  No new sites of disease.  -Per patient Dr. Kiser is ordering imaging studies q3 months.  -He was started on Entecavir to protect against HBV reactivation - this dose was increased due to recent decompensation and he is currently taking 1mg daily.    RTC in 3 months with repeat labs today and before RPA.   The patient was examined, and the treatment plan was reviewed in consultation with Dr. Mao.  Bushra Hensley, MSN, FNP-BC Transplant Hepatology Nurse Practitioner [facility] [address] [place] [id number] T: [phone] | F: [phone].

## 2024-11-10 NOTE — HISTORY OF PRESENT ILLNESS
[FreeTextEntry1] : Mr. HONG KEARNS a 69 year White male  presents today for  a hepatology appointment. [facility] and has been referred to us by WARREN KISER.  He had a history of poor medical follow-up, hypertension (HTN), and untreated Hepatitis C (incidentally detected, HCV Viral Load: 602436 IU/mL), recently diagnosed with cholangiocarcinoma. Of note, his wife has hepatitis C and was treated a long time ago, and apparently was cured.  Hong's recent medical journey began when he presented to [facility] on August 10, 2023, complaining of symptoms suggestive of a urinary tract infection (UTI). Notably, he had experienced significant weight loss and failure to thrive. He reported a progressive weakening of his overall health over the past 2-3 months, accompanied by an unintentional weight loss of a staggering 60 pounds within the last 2-3 years.  A CT scan of the chest, abdomen, and pelvis conducted on August 10, 2023, revealed concerning findings. There was a large and ill-defined hypodensity involving a substantial portion of the right hepatic lobe, accompanied by retroperitoneal lymphadenopathy.  Further imaging, an MRI of the abdomen on August 11, 2023, raised suspicion of multifocal intrahepatic cholangiocarcinoma, with the dominant mass measuring up to 11.8 cm in the right hepatic lobe. Additionally, there was evidence of retroperitoneal, periportal, and gastric hepatic lymphadenopathy, suggesting possible metastatic spread. To confirm the diagnosis, an interventional radiologist performed a liver biopsy on August 14, 2023, which was consistent with adenocarcinoma, moderately differentiated. Immunohistochemistry (IHC) stains were positive for CK7 and CK19. It was also discovered that Hong had a positive test for hepatitis C.  Subsequently, a PET/CT scan on August 22, 2023, revealed a dominant FDG-avid lesion in the right hepatic lobe, along with multiple smaller FDG-avid bilobar hepatic lesions, corresponding to the biopsy-proven malignancy. FDG-avid retroperitoneal and inferior preesophageal lymphadenopathy were also observed. Furthermore, there was a focus of FDG avidity in the upper rectum, along with a 2 cm soft tissue density on CT, raising concerns about a possible neoplasm. Additionally, there was nonspecific mild hypermetabolism in the fundus of the stomach, which may reflect inflammation.  Tumor Markers: AFP <1.8 on August 10, 2023  A recent flex sigmoidoscopy revealed the presence of polyps, which were biopsied, with pathology results currently pending. He is awaiting a formal full length colonoscopy through his colorectal surgeon.   Feb 21, 2024: Between October 19, 2023, and December 28, 2023, CT scans showed no significant change in the hepatic lesions or lymphadenopathy for this patient with known cholangiocarcinoma. There was also no change in the previously noted splenomegaly. The patient had an elective esophagogastroduodenoscopy (EGD) that revealed no esophageal varices but did show signs of portal hypertensive gastropathy and two small nodules in the duodenum, one of which was biopsied. There was no active bleeding observed immediately after the biopsy. However, about a week later, the patient experienced hematemesis and melena. A follow-up EGD raised suspicions of duodenal varices at the prior biopsy site. A subsequent CT scan with IV contrast confirmed the presence of varices and also detected new ascites. Treatment for the duodenal varices included another EGD, where glue injection successfully controlled the bleeding. Despite this, the patient's ascites increased, leading to a large-volume paracentesis (LVP) on February 16, 2024, during which 2.5 liters of fluid were removed. This procedure provided temporary relief, but the ascites quickly reaccumulated. Fluid analysis and cytology results are pending, but the serum-ascites albumin gradient (SAAG) indicates portal hypertension. I have recommended starting Lasix 40 mg and Aldactone 100 mg, with a follow-up lab test also ordered. We thoroughly discussed his long-term prognosis, considering the complexity of his condition, which likely includes cirrhosis, portal hypertension, decompensation with ascites, ongoing cholangiocarcinoma, and untreated hepatitis C virus (HCV) infection. I have advised a follow up EGD in about 30 days from the last one.  Feb 28, 2024: He returned for a follow-up and is generally feeling better, though he experienced some intermittent abdominal pain and nausea, which have now resolved. Ascites resolved clinically on diuretics.  Dr. Kiser has increased his Protonix dosage to 40 mg twice daily and started him on Famotidine 20 mg twice daily. His labs from February 21, 2024, show stable liver function tests with an AST of 45, ALT of 10, ALP of 142, and bilirubin at 0.5. His hemoglobin level is 10.5, which remains stable, and there has been no interval gastrointestinal bleeding (he denies any occurrence of black stools).  March 21, 2024: Patient returns for a follow up. Doing overall well. No interval GI bleeding. Patient is this is significantly improved with diuretics currently on Lasix 40 mg daily and Aldactone 100 mg daily.  He has resumed Gemzar 800 mg/m along with Abraxane 100 mg/m.  The dose was apparently reduced due to patient's ongoing fatigue and portal hypertension.  Patient has been taking marijuana for symptoms of nausea.  He has been referred by oncology to palliative care for medical marijuana certification to help symptom management.  We reviewed blood test results from March 18, 2024.  This revealed mildly elevated liver test with AST 54, ALT 50, alkaline phosphatase 140 units/L.  Total bili 1.6 mg/dL.  This is relatively stable compared to prior labs.  His CA 19-9 is 9 level is 77 (down from 81).  CBC shows a hemoglobin of 11.1 g/dL which is stable.  Platelet counts are 248,000.  He was scheduled for an upper endoscopy in March 18, 2024 but on the same day chemotherapy was scheduled so the EGD will be rescheduled.  June 11, 2024: Patient returns for routine follow-up. He is s/p hospitalization from 5/31-6/2 for follow up workup for acute cholecystitis based on outpatient CT Abd findings - workup was negative, however he was incidentally found to have a PE. He was initially started on AC management with Lovenox but now transitioned to Eliquis. He was also found to have hepatic encephalopathy on admission and was started on Lactulose. He was transitioned to oral chemo (Futibatinib) this week after discharge. As per the daughter, since starting chemo he has become more constipated despite taking Lactulose as ordered. The patient's daughter reports his last para was on 5/8/24 - removed 1750mL.  He continues to complain of peripheral neuropathy, was previously treated with Cymbalta but caused worsening fatigue. He currently denies abdominal pain, N/V/C/D.   July 15, 2024: Patient returns for routine follow up. He states that since starting Midodrine and recent PHos binder to improve phosphorus he has felt more nauseous, reports decrease in appetite, denies abdominal pain, C/D. BP during office visit today is still borderline hypotension : 101/5. Patient recently called office with c/o hypotension in which Dr Mao initiated Midodrine 5mg TID. No adjustments were made to diuretic regimen: he continues to remain on Furosemide 40mg daily and Spirinolactone 100mg daily.   August 14, 2024:  The patient is here for a follow-up visit and is otherwise doing well with no acute complaints. His previous issues with hypotension have resolved. He recently saw Dr. Kiser, and his labs show a downtrending CA 19-9. Overall, lab results are stable. He is back on his diuretics, Lasix 40 mg daily, and reduced dose of Aldactone 50 mg (down from 100 mg) and Midodrine.   Sept 18, 2024: The patient presents today reporting chemotherapy-related side effects, including pain in his feet and toes due to neuropathy. He also feels that his joint pain has worsened since starting chemotherapy. He denies any active gastrointestinal complaints but mentions ongoing constipation. He is interested in getting his HCV treated.  Nov 6, 2024: The patient returns for a routine follow-up, accompanied by his daughter. He has been on Epclusa for the past month and is tolerating it well. He reports feeling well overall and denies any abdominal pain, nausea, vomiting, constipation, or diarrhea. We reviewed lab results from October 28, which showed normal liver function tests and an improvement in CA 19-9.

## 2024-11-25 NOTE — HISTORY OF PRESENT ILLNESS
[Disease: _____________________] : Disease: [unfilled] [AJCC Stage: ____] : AJCC Stage: [unfilled] [Therapy: ___] : Therapy: [unfilled] [de-identified] : 68 M with poor medical follow up, HTN, untreated Hepatitis C presents for management of newly diagnosed cholagiocarcinoma, FGFR2 fusion.   Hong presented to [facility] on 8/10/23 with UTI symptoms, FTT with significant weight loss.  He repors 2-3 mos of progressive weakness, unintentional weight loss of 60 lbs in 2-3 yrs.  CT CAP 8/10/23 showed Large and ill-defined hepatic hypodensity involving a large portion of the right hepatic lobe, retroperitoneal lymphadenopathy. MRI Abd on 8/11/23 with findings are suspicious for multifocal intrahepatic cholangiocarcinoma, with the dominant mass in the right hepatic lobe measuring up to 11.8 cm. Retroperitoneal, periportal and gastric hepatic lymphadenopathy is favored to be metastatic, IR performed liver biopsy 8/14/23 c/w adenocarcinoma, moderately differentiated, IHC stains + CK7,  CK19. Patient found to be + hepatitis C.   PET/CT on 8/22/23  showed FDG-avid dominant lesion in right hepatic lobe and multiple small FDG-avid bilobar hepatic lesions correspond to biopsy-proven malignancy. FDG-avid retroperitoneal and inferior preesophageal lymphadenopathy. FDG-avid focus in upper rectum with questionable corresponding 2 cm soft tissue density on CT, is concerning for neoplasm. Nonspecific, mild hypermetabolism in fundus of stomach may reflect inflammation.   Referred to medical oncology.   9/8/23: Colonoscopy: large rectal polyp resected, benign 9/11/23: C1D1 Gemzar/Cisplatin/Durvalumab  9/18/23: C1D8 10/2/23:C2D1 10/9/23: C2D8 c/b cisplatin rxn with back pain, radiating to chest, hypertensive. Reaction occurred after Cisplatin infusion. Cisplatin d/c'ed 10/19/23: CT CAP: no change in hepatic lesions and RP nodes 10/23/23: C3D1 [place]/Durva alone 10/30/23 C3D8 discussed with pt, concern that Gemzar/Durva alone will not be enough to control disease. Recommendation to add Abraxane. Will d/c Durvalumab as no benefit known with combining with Abraxane.  11/13-1/22/24: C1-3D15 Gemzar/Abraxane 12/28/23: CT C/A/P - stable hepatic lesions and RP LAD 1/29/24: EGD: portal gastropathy 2/5-2/9/14: Admitted to [facility] with melena (2/5 tx held), found to have duodenal variceal bleed s/p obliteration. CT A/P 2/5/24 noted continued liver mass with satellite lesions, difficult to compare to prior scan 2/16/24: US Para: minimal ascites, 50 cc removed c/w portal HTN, neg cytology  3/4/24: resumed Gemzar 800 mg/m2 + Abraxane 100 mg/m2, C4D1 3/18/24: C4D15 4/1/24: C5D1 4/8/24: CT CAP: SD 4/15/24: C5D15 4/29/24: C6D1 5/31/24: CT CAP: incidental RUL segmental PE. Large infiltrative right hepatic lobe mass grossly stable. Multiple satellite nodules in the liver + periportal & retroperitoneal LAD stable. Increasing GB distension w/ increased RUQ & pericholecystic fluid.  5/31/24-6/2/24: Admitted for management of PE & GB distension. Started heparin > eliquis. TTE negative for R heart strain. Per surgery, no surgical intervention, treat w/ ABX  6/3/24: Started Futibatinib 12 mg 6/24/24: Increased Futibatinib 16 mg 7/23/24: futibatinib on hold d/t symptomatic hypotension  7/31/24: resumed futibatinib 12 mg po QD 8/29/24: CT CAP: interval decrease in overall tumor burden with right hepatic lobe, interval decrease in LAD. No new sites of diseas  [de-identified] : adenocarcinoma, moderately differentiated  [de-identified] : CHIO TMB 1   FGFR2 FGFR2-SORBS1 fusion IDH2 [id number] CBL C416S - subclonal SETD2 [id number] TERT promoter -124C> [de-identified] : Here with his family  Red light therapy/acupuncture has help with the neuropathy in his hands, not as much in his feet. Neuropathy in his feet more bothersome at night. Oxycodone alleviates pain  Now taking 1 tab at night instead of 0.5 tab  Otherwise, doing well.

## 2024-11-25 NOTE — REVIEW OF SYSTEMS
[Diarrhea: Grade 0] : Diarrhea: Grade 0 [Muscle Weakness] : muscle weakness [Negative] : Allergic/Immunologic [de-identified] : + PN

## 2024-11-25 NOTE — HISTORY OF PRESENT ILLNESS
[Disease: _____________________] : Disease: [unfilled] [AJCC Stage: ____] : AJCC Stage: [unfilled] [Therapy: ___] : Therapy: [unfilled] [de-identified] : 68 M with poor medical follow up, HTN, untreated Hepatitis C presents for management of newly diagnosed cholagiocarcinoma, FGFR2 fusion.   Hong presented to [facility] on 8/10/23 with UTI symptoms, FTT with significant weight loss.  He repors 2-3 mos of progressive weakness, unintentional weight loss of 60 lbs in 2-3 yrs.  CT CAP 8/10/23 showed Large and ill-defined hepatic hypodensity involving a large portion of the right hepatic lobe, retroperitoneal lymphadenopathy. MRI Abd on 8/11/23 with findings are suspicious for multifocal intrahepatic cholangiocarcinoma, with the dominant mass in the right hepatic lobe measuring up to 11.8 cm. Retroperitoneal, periportal and gastric hepatic lymphadenopathy is favored to be metastatic, IR performed liver biopsy 8/14/23 c/w adenocarcinoma, moderately differentiated, IHC stains + CK7,  CK19. Patient found to be + hepatitis C.   PET/CT on 8/22/23  showed FDG-avid dominant lesion in right hepatic lobe and multiple small FDG-avid bilobar hepatic lesions correspond to biopsy-proven malignancy. FDG-avid retroperitoneal and inferior preesophageal lymphadenopathy. FDG-avid focus in upper rectum with questionable corresponding 2 cm soft tissue density on CT, is concerning for neoplasm. Nonspecific, mild hypermetabolism in fundus of stomach may reflect inflammation.   Referred to medical oncology.   9/8/23: Colonoscopy: large rectal polyp resected, benign 9/11/23: C1D1 Gemzar/Cisplatin/Durvalumab  9/18/23: C1D8 10/2/23:C2D1 10/9/23: C2D8 c/b cisplatin rxn with back pain, radiating to chest, hypertensive. Reaction occurred after Cisplatin infusion. Cisplatin d/c'ed 10/19/23: CT CAP: no change in hepatic lesions and RP nodes 10/23/23: C3D1 [place]/Durva alone 10/30/23 C3D8 discussed with pt, concern that Gemzar/Durva alone will not be enough to control disease. Recommendation to add Abraxane. Will d/c Durvalumab as no benefit known with combining with Abraxane.  11/13-1/22/24: C1-3D15 Gemzar/Abraxane 12/28/23: CT C/A/P - stable hepatic lesions and RP LAD 1/29/24: EGD: portal gastropathy 2/5-2/9/14: Admitted to [facility] with melena (2/5 tx held), found to have duodenal variceal bleed s/p obliteration. CT A/P 2/5/24 noted continued liver mass with satellite lesions, difficult to compare to prior scan 2/16/24: US Para: minimal ascites, 50 cc removed c/w portal HTN, neg cytology  3/4/24: resumed Gemzar 800 mg/m2 + Abraxane 100 mg/m2, C4D1 3/18/24: C4D15 4/1/24: C5D1 4/8/24: CT CAP: SD 4/15/24: C5D15 4/29/24: C6D1 5/31/24: CT CAP: incidental RUL segmental PE. Large infiltrative right hepatic lobe mass grossly stable. Multiple satellite nodules in the liver + periportal & retroperitoneal LAD stable. Increasing GB distension w/ increased RUQ & pericholecystic fluid.  5/31/24-6/2/24: Admitted for management of PE & GB distension. Started heparin > eliquis. TTE negative for R heart strain. Per surgery, no surgical intervention, treat w/ ABX  6/3/24: Started Futibatinib 12 mg 6/24/24: Increased Futibatinib 16 mg 7/23/24: futibatinib on hold d/t symptomatic hypotension  7/31/24: resumed futibatinib 12 mg po QD 8/29/24: CT CAP: interval decrease in overall tumor burden with right hepatic lobe, interval decrease in LAD. No new sites of diseas  [de-identified] : adenocarcinoma, moderately differentiated  [de-identified] : CHIO TMB 1   FGFR2 FGFR2-SORBS1 fusion IDH2 [id number] CBL C416S - subclonal SETD2 [id number]*22 TERT promoter -124C> [de-identified] : Here with his family  Red light therapy/acupuncture has help with the neuropathy in his hands, not as much in his feet. Neuropathy in his feet more bothersome at night. Oxycodone alleviates pain  Now taking 1 tab at night instead of 0.5 tab  Otherwise, doing well.

## 2024-11-25 NOTE — ASSESSMENT
[Future Reassessment of Pain Scale] : Future reassessment of pain scale    [Palliative] : Goals of care discussed with patient: Palliative [Palliative Care Plan] : not applicable at this time [Medication(s)] : Medication(s) [FreeTextEntry1] : Patient seen and discussed with Dr. Hilda Kiser.

## 2024-11-25 NOTE — REVIEW OF SYSTEMS
[Diarrhea: Grade 0] : Diarrhea: Grade 0 [Muscle Weakness] : muscle weakness [Negative] : Allergic/Immunologic [de-identified] : + PN

## 2024-12-06 NOTE — REASON FOR VISIT
[Follow-Up Visit] : a follow-up [Urgent Visit] : an urgent  [FreeTextEntry2] : Stage IV cholangiocarcinoma, FGFR fusion

## 2024-12-06 NOTE — HISTORY OF PRESENT ILLNESS
[Disease: _____________________] : Disease: [unfilled] [AJCC Stage: ____] : AJCC Stage: [unfilled] [Therapy: ___] : Therapy: [unfilled] [de-identified] : 68 M with poor medical follow up, HTN, untreated Hepatitis C presents for management of newly diagnosed cholagiocarcinoma, FGFR2 fusion.   Hong presented to [facility] on 8/10/23 with UTI symptoms, FTT with significant weight loss.  He repors 2-3 mos of progressive weakness, unintentional weight loss of 60 lbs in 2-3 yrs.  CT CAP 8/10/23 showed Large and ill-defined hepatic hypodensity involving a large portion of the right hepatic lobe, retroperitoneal lymphadenopathy. MRI Abd on 8/11/23 with findings are suspicious for multifocal intrahepatic cholangiocarcinoma, with the dominant mass in the right hepatic lobe measuring up to 11.8 cm. Retroperitoneal, periportal and gastric hepatic lymphadenopathy is favored to be metastatic, IR performed liver biopsy 8/14/23 c/w adenocarcinoma, moderately differentiated, IHC stains + CK7,  CK19. Patient found to be + hepatitis C.   PET/CT on 8/22/23  showed FDG-avid dominant lesion in right hepatic lobe and multiple small FDG-avid bilobar hepatic lesions correspond to biopsy-proven malignancy. FDG-avid retroperitoneal and inferior preesophageal lymphadenopathy. FDG-avid focus in upper rectum with questionable corresponding 2 cm soft tissue density on CT, is concerning for neoplasm. Nonspecific, mild hypermetabolism in fundus of stomach may reflect inflammation.   Referred to medical oncology.   9/8/23: Colonoscopy: large rectal polyp resected, benign 9/11/23: C1D1 Gemzar/Cisplatin/Durvalumab  9/18/23: C1D8 10/2/23:C2D1 10/9/23: C2D8 c/b cisplatin rxn with back pain, radiating to chest, hypertensive. Reaction occurred after Cisplatin infusion. Cisplatin d/c'ed 10/19/23: CT CAP: no change in hepatic lesions and RP nodes 10/23/23: C3D1 [place]/Durva alone 10/30/23 C3D8 discussed with pt, concern that Gemzar/Durva alone will not be enough to control disease. Recommendation to add Abraxane. Will d/c Durvalumab as no benefit known with combining with Abraxane.  11/13-1/22/24: C1-3D15 Gemzar/Abraxane 12/28/23: CT C/A/P - stable hepatic lesions and RP LAD 1/29/24: EGD: portal gastropathy 2/5-2/9/14: Admitted to [facility] with melena (2/5 tx held), found to have duodenal variceal bleed s/p obliteration. CT A/P 2/5/24 noted continued liver mass with satellite lesions, difficult to compare to prior scan 2/16/24: US Para: minimal ascites, 50 cc removed c/w portal HTN, neg cytology  3/4/24: resumed Gemzar 800 mg/m2 + Abraxane 100 mg/m2, C4D1 3/18/24: C4D15 4/1/24: C5D1 4/8/24: CT CAP: SD 4/15/24: C5D15 4/29/24: C6D1 5/31/24: CT CAP: incidental RUL segmental PE. Large infiltrative right hepatic lobe mass grossly stable. Multiple satellite nodules in the liver + periportal & retroperitoneal LAD stable. Increasing GB distension w/ increased RUQ & pericholecystic fluid.  5/31/24-6/2/24: Admitted for management of PE & GB distension. Started heparin > eliquis. TTE negative for R heart strain. Per surgery, no surgical intervention, treat w/ ABX  6/3/24: Started Futibatinib 12 mg 6/24/24: Increased Futibatinib 16 mg 7/23/24: futibatinib on hold d/t symptomatic hypotension  7/31/24: resumed futibatinib 12 mg po QD 8/29/24: CT CAP: interval decrease in overall tumor burden with right hepatic lobe, interval decrease in LAD. No new sites of disease 12/2/24: CT CAP : Ill-defined confluent hypodense hepatic lesions within the right hepatic lobe measure smaller compared with the prior exam. A previously visualized 1.0 cm arterially enhancing lesion within segment 3 is not definitively seen. Other scattered small hypodense lesions are not significant changed. Abdominal and retroperitoneal lymphadenopathy is not significantly changed.  [de-identified] : adenocarcinoma, moderately differentiated  [de-identified] : CHIO TMB 1   FGFR2 FGFR2-SORBS1 fusion IDH2 R172K CBL C416S - subclonal SETD2 [id number]*22 TERT promoter -124C> [de-identified] : semi-urgent visit  C/o worsening cramping in his hands and legs. Will feel in his abdomen as well Started about one week ago.

## 2024-12-06 NOTE — HISTORY OF PRESENT ILLNESS
[Disease: _____________________] : Disease: [unfilled] [AJCC Stage: ____] : AJCC Stage: [unfilled] [Therapy: ___] : Therapy: [unfilled] [de-identified] : 68 M with poor medical follow up, HTN, untreated Hepatitis C presents for management of newly diagnosed cholagiocarcinoma, FGFR2 fusion.   Hong presented to [facility] on 8/10/23 with UTI symptoms, FTT with significant weight loss.  He repors 2-3 mos of progressive weakness, unintentional weight loss of 60 lbs in 2-3 yrs.  CT CAP 8/10/23 showed Large and ill-defined hepatic hypodensity involving a large portion of the right hepatic lobe, retroperitoneal lymphadenopathy. MRI Abd on 8/11/23 with findings are suspicious for multifocal intrahepatic cholangiocarcinoma, with the dominant mass in the right hepatic lobe measuring up to 11.8 cm. Retroperitoneal, periportal and gastric hepatic lymphadenopathy is favored to be metastatic, IR performed liver biopsy 8/14/23 c/w adenocarcinoma, moderately differentiated, IHC stains + CK7,  CK19. Patient found to be + hepatitis C.   PET/CT on 8/22/23  showed FDG-avid dominant lesion in right hepatic lobe and multiple small FDG-avid bilobar hepatic lesions correspond to biopsy-proven malignancy. FDG-avid retroperitoneal and inferior preesophageal lymphadenopathy. FDG-avid focus in upper rectum with questionable corresponding 2 cm soft tissue density on CT, is concerning for neoplasm. Nonspecific, mild hypermetabolism in fundus of stomach may reflect inflammation.   Referred to medical oncology.   9/8/23: Colonoscopy: large rectal polyp resected, benign 9/11/23: C1D1 Gemzar/Cisplatin/Durvalumab  9/18/23: C1D8 10/2/23:C2D1 10/9/23: C2D8 c/b cisplatin rxn with back pain, radiating to chest, hypertensive. Reaction occurred after Cisplatin infusion. Cisplatin d/c'ed 10/19/23: CT CAP: no change in hepatic lesions and RP nodes 10/23/23: C3D1 [place]/Durva alone 10/30/23 C3D8 discussed with pt, concern that Gemzar/Durva alone will not be enough to control disease. Recommendation to add Abraxane. Will d/c Durvalumab as no benefit known with combining with Abraxane.  11/13-1/22/24: C1-3D15 Gemzar/Abraxane 12/28/23: CT C/A/P - stable hepatic lesions and RP LAD 1/29/24: EGD: portal gastropathy 2/5-2/9/14: Admitted to [facility] with melena (2/5 tx held), found to have duodenal variceal bleed s/p obliteration. CT A/P 2/5/24 noted continued liver mass with satellite lesions, difficult to compare to prior scan 2/16/24: US Para: minimal ascites, 50 cc removed c/w portal HTN, neg cytology  3/4/24: resumed Gemzar 800 mg/m2 + Abraxane 100 mg/m2, C4D1 3/18/24: C4D15 4/1/24: C5D1 4/8/24: CT CAP: SD 4/15/24: C5D15 4/29/24: C6D1 5/31/24: CT CAP: incidental RUL segmental PE. Large infiltrative right hepatic lobe mass grossly stable. Multiple satellite nodules in the liver + periportal & retroperitoneal LAD stable. Increasing GB distension w/ increased RUQ & pericholecystic fluid.  5/31/24-6/2/24: Admitted for management of PE & GB distension. Started heparin > eliquis. TTE negative for R heart strain. Per surgery, no surgical intervention, treat w/ ABX  6/3/24: Started Futibatinib 12 mg 6/24/24: Increased Futibatinib 16 mg 7/23/24: futibatinib on hold d/t symptomatic hypotension  7/31/24: resumed futibatinib 12 mg po QD 8/29/24: CT CAP: interval decrease in overall tumor burden with right hepatic lobe, interval decrease in LAD. No new sites of disease 12/2/24: CT CAP : Ill-defined confluent hypodense hepatic lesions within the right hepatic lobe measure smaller compared with the prior exam. A previously visualized 1.0 cm arterially enhancing lesion within segment 3 is not definitively seen. Other scattered small hypodense lesions are not significant changed. Abdominal and retroperitoneal lymphadenopathy is not significantly changed.  [de-identified] : adenocarcinoma, moderately differentiated  [de-identified] : CHIO TMB 1   FGFR2 FGFR2-SORBS1 fusion IDH2 R172K CBL C416S - subclonal SETD2 [id number] TERT promoter -124C> [de-identified] : semi-urgent visit  C/o worsening cramping in his hands and legs. Will feel in his abdomen as well Started about one week ago.

## 2024-12-06 NOTE — REVIEW OF SYSTEMS
[Diarrhea: Grade 0] : Diarrhea: Grade 0 [Muscle Weakness] : muscle weakness [Negative] : Allergic/Immunologic [de-identified] : + PN

## 2024-12-06 NOTE — REVIEW OF SYSTEMS
[Diarrhea: Grade 0] : Diarrhea: Grade 0 [Muscle Weakness] : muscle weakness [Negative] : Allergic/Immunologic [de-identified] : + PN

## 2024-12-06 NOTE — ASSESSMENT
[Future Reassessment of Pain Scale] : Future reassessment of pain scale    [Palliative] : Goals of care discussed with patient: Palliative [Palliative Care Plan] : not applicable at this time [FreeTextEntry1] : Patient seen and discussed with Dr. Hilda Kiser.

## 2024-12-10 NOTE — DISCUSSION/SUMMARY
[Medication Risks Reviewed] : Medication risks reviewed [PRN] : PRN [de-identified] : 69 year old male with R shoulder pain likely 2/2 SA-SD bursitis vs progression of glenohumeral arthritis. Xray imaging today demonstrates progression of OA. Given ongoing symptoms, discussed starting with SA-SD bursa injection. If he does not improve with this, then we will consider a glenohumeral joint injection. Patient in agreement with this plan.      Attending note.  Agree with above.  Patient complaining of chronic right shoulder pain with decreased range of motion secondary to pain.  X-rays today of the right shoulder showed severe osteoarthritis and demineralization.  As patient is on Eliquis, decision to try SASD bursa injection first.  Patient had no immediate improvement.  If patient fails to show improvement over the next 2 to 3 days he will return to the office for glenohumeral injection.

## 2024-12-10 NOTE — HISTORY OF PRESENT ILLNESS
[de-identified] : 69M presents today for worsening atraumatic R shoulder pain. States it is worse with overhead movements. Patient has continued to work. Weakness in the setting of pain.No numbness or tingling.

## 2024-12-10 NOTE — PHYSICAL EXAM
[de-identified] : Shoulder Exam Inspection: No deformities. No erythema, warmth, edema, ecchymosis. No atrophy is appreciated. Palpation: No SC, AC, clavicular or scapular spine tenderness. AROM: 0-100 degrees forward flexion, 0-100 degrees abduction, 0-50 degrees adduction Internal Rotation to 90 degrees. External rotation to 90 degrees. Strength: Strength 4/5 in shoulder abduction, adduction, internal rotation, external rotation, flexion. decreased in the setting of pain Neurovascular: Radial pulse 2+. SILT in axillary, radial, ulnar, median nerve distributions [de-identified] : 12/10/2024: XR shoulder imaging demonstrates decreased joint space with osteophytes and sclerosis particularly in inferomedial joint space. AC joint space with degeneration.

## 2024-12-10 NOTE — PROCEDURE
[Injection] : Injection [Right] : of the right [Subacromial Bursa] : subacromial bursa [Joint Pain] : joint pain [Bleeding] : bleeding [Risk] : risk [Alcohol] : alcohol [Betadine] : betadine [Ethyl Chloride Spray] : ethyl chloride spray was used as a topical anesthetic [Ultrasound Guided] : The procedure was ultrasound guided.   [Anterior] : anterior [25] : a 25-gauge [1% Lidocaine___(mL)] : [unfilled] mL of 1% Lidocaine [Betameth. 3mg/mL___(mL)] : [unfilled] mL of 3mg/mL betamethasone [Bandage Applied] : a bandage [Tolerated Well] : The patient tolerated the procedure well [None] : none

## 2024-12-16 NOTE — REVIEW OF SYSTEMS
[Diarrhea: Grade 0] : Diarrhea: Grade 0 [Muscle Weakness] : muscle weakness [Negative] : Allergic/Immunologic [de-identified] : + PN

## 2024-12-16 NOTE — HISTORY OF PRESENT ILLNESS
[Disease: _____________________] : Disease: [unfilled] [AJCC Stage: ____] : AJCC Stage: [unfilled] [Therapy: ___] : Therapy: [unfilled] [de-identified] : 68 M with poor medical follow up, HTN, untreated Hepatitis C presents for management of newly diagnosed cholagiocarcinoma, FGFR2 fusion.   Hong presented to [facility] on 8/10/23 with UTI symptoms, FTT with significant weight loss.  He repors 2-3 mos of progressive weakness, unintentional weight loss of 60 lbs in 2-3 yrs.  CT CAP 8/10/23 showed Large and ill-defined hepatic hypodensity involving a large portion of the right hepatic lobe, retroperitoneal lymphadenopathy. MRI Abd on 8/11/23 with findings are suspicious for multifocal intrahepatic cholangiocarcinoma, with the dominant mass in the right hepatic lobe measuring up to 11.8 cm. Retroperitoneal, periportal and gastric hepatic lymphadenopathy is favored to be metastatic, IR performed liver biopsy 8/14/23 c/w adenocarcinoma, moderately differentiated, IHC stains + CK7,  CK19. Patient found to be + hepatitis C.   PET/CT on 8/22/23  showed FDG-avid dominant lesion in right hepatic lobe and multiple small FDG-avid bilobar hepatic lesions correspond to biopsy-proven malignancy. FDG-avid retroperitoneal and inferior preesophageal lymphadenopathy. FDG-avid focus in upper rectum with questionable corresponding 2 cm soft tissue density on CT, is concerning for neoplasm. Nonspecific, mild hypermetabolism in fundus of stomach may reflect inflammation.   Referred to medical oncology.   9/8/23: Colonoscopy: large rectal polyp resected, benign 9/11/23: C1D1 Gemzar/Cisplatin/Durvalumab  9/18/23: C1D8 10/2/23:C2D1 10/9/23: C2D8 c/b cisplatin rxn with back pain, radiating to chest, hypertensive. Reaction occurred after Cisplatin infusion. Cisplatin d/c'ed 10/19/23: CT CAP: no change in hepatic lesions and RP nodes 10/23/23: C3D1 [place]/Durva alone 10/30/23 C3D8 discussed with pt, concern that Gemzar/Durva alone will not be enough to control disease. Recommendation to add Abraxane. Will d/c Durvalumab as no benefit known with combining with Abraxane.  11/13-1/22/24: C1-3D15 Gemzar/Abraxane 12/28/23: CT C/A/P - stable hepatic lesions and RP LAD 1/29/24: EGD: portal gastropathy 2/5-2/9/14: Admitted to [facility] with melena (2/5 tx held), found to have duodenal variceal bleed s/p obliteration. CT A/P 2/5/24 noted continued liver mass with satellite lesions, difficult to compare to prior scan 2/16/24: US Para: minimal ascites, 50 cc removed c/w portal HTN, neg cytology  3/4/24: resumed Gemzar 800 mg/m2 + Abraxane 100 mg/m2, C4D1 3/18/24: C4D15 4/1/24: C5D1 4/8/24: CT CAP: SD 4/15/24: C5D15 4/29/24: C6D1 5/31/24: CT CAP: incidental RUL segmental PE. Large infiltrative right hepatic lobe mass grossly stable. Multiple satellite nodules in the liver + periportal & retroperitoneal LAD stable. Increasing GB distension w/ increased RUQ & pericholecystic fluid.  5/31/24-6/2/24: Admitted for management of PE & GB distension. Started heparin > eliquis. TTE negative for R heart strain. Per surgery, no surgical intervention, treat w/ ABX  6/3/24: Started Futibatinib 12 mg 6/24/24: Increased Futibatinib 16 mg 7/23/24: futibatinib on hold d/t symptomatic hypotension  7/31/24: resumed futibatinib 12 mg po QD 8/29/24: CT CAP: interval decrease in overall tumor burden with right hepatic lobe, interval decrease in LAD. No new sites of disease 12/2/24: CT CAP : Ill-defined confluent hypodense hepatic lesions within the right hepatic lobe measure smaller compared with the prior exam. A previously visualized 1.0 cm arterially enhancing lesion within segment 3 is not definitively seen. Other scattered small hypodense lesions are not significant changed. Abdominal and retroperitoneal lymphadenopathy is not significantly changed.  [de-identified] : adenocarcinoma, moderately differentiated  [de-identified] : CHIO TMB 1   FGFR2 FGFR2-SORBS1 fusion IDH2 R172K CBL C416S - subclonal SETD2 [id number] TERT promoter -124C> [de-identified] : Cramping at night  Nipples are sensitive  hands are dry, nails are better since starting steroid cream.  good appetite. cont to work full time

## 2024-12-16 NOTE — REASON FOR VISIT
[Follow-Up Visit] : a follow-up [Family Member] : family member [FreeTextEntry2] : Stage IV cholangiocarcinoma, FGFR fusion

## 2024-12-16 NOTE — ASSESSMENT
[Future Reassessment of Pain Scale] : Future reassessment of pain scale    [Palliative] : Goals of care discussed with patient: Palliative [Palliative Care Plan] : not applicable at this time [FreeTextEntry1] : Nipple sensitivity likely 2/2 spironolactone treatment  cont hydration and mg tabs to help with cramping

## 2024-12-16 NOTE — HISTORY OF PRESENT ILLNESS
[Disease: _____________________] : Disease: [unfilled] [AJCC Stage: ____] : AJCC Stage: [unfilled] [Therapy: ___] : Therapy: [unfilled] [de-identified] : 68 M with poor medical follow up, HTN, untreated Hepatitis C presents for management of newly diagnosed cholagiocarcinoma, FGFR2 fusion.   Hong presented to [facility] on 8/10/23 with UTI symptoms, FTT with significant weight loss.  He repors 2-3 mos of progressive weakness, unintentional weight loss of 60 lbs in 2-3 yrs.  CT CAP 8/10/23 showed Large and ill-defined hepatic hypodensity involving a large portion of the right hepatic lobe, retroperitoneal lymphadenopathy. MRI Abd on 8/11/23 with findings are suspicious for multifocal intrahepatic cholangiocarcinoma, with the dominant mass in the right hepatic lobe measuring up to 11.8 cm. Retroperitoneal, periportal and gastric hepatic lymphadenopathy is favored to be metastatic, IR performed liver biopsy 8/14/23 c/w adenocarcinoma, moderately differentiated, IHC stains + CK7,  CK19. Patient found to be + hepatitis C.   PET/CT on 8/22/23  showed FDG-avid dominant lesion in right hepatic lobe and multiple small FDG-avid bilobar hepatic lesions correspond to biopsy-proven malignancy. FDG-avid retroperitoneal and inferior preesophageal lymphadenopathy. FDG-avid focus in upper rectum with questionable corresponding 2 cm soft tissue density on CT, is concerning for neoplasm. Nonspecific, mild hypermetabolism in fundus of stomach may reflect inflammation.   Referred to medical oncology.   9/8/23: Colonoscopy: large rectal polyp resected, benign 9/11/23: C1D1 Gemzar/Cisplatin/Durvalumab  9/18/23: C1D8 10/2/23:C2D1 10/9/23: C2D8 c/b cisplatin rxn with back pain, radiating to chest, hypertensive. Reaction occurred after Cisplatin infusion. Cisplatin d/c'ed 10/19/23: CT CAP: no change in hepatic lesions and RP nodes 10/23/23: C3D1 [place]/Durva alone 10/30/23 C3D8 discussed with pt, concern that Gemzar/Durva alone will not be enough to control disease. Recommendation to add Abraxane. Will d/c Durvalumab as no benefit known with combining with Abraxane.  11/13-1/22/24: C1-3D15 Gemzar/Abraxane 12/28/23: CT C/A/P - stable hepatic lesions and RP LAD 1/29/24: EGD: portal gastropathy 2/5-2/9/14: Admitted to [facility] with melena (2/5 tx held), found to have duodenal variceal bleed s/p obliteration. CT A/P 2/5/24 noted continued liver mass with satellite lesions, difficult to compare to prior scan 2/16/24: US Para: minimal ascites, 50 cc removed c/w portal HTN, neg cytology  3/4/24: resumed Gemzar 800 mg/m2 + Abraxane 100 mg/m2, C4D1 3/18/24: C4D15 4/1/24: C5D1 4/8/24: CT CAP: SD 4/15/24: C5D15 4/29/24: C6D1 5/31/24: CT CAP: incidental RUL segmental PE. Large infiltrative right hepatic lobe mass grossly stable. Multiple satellite nodules in the liver + periportal & retroperitoneal LAD stable. Increasing GB distension w/ increased RUQ & pericholecystic fluid.  5/31/24-6/2/24: Admitted for management of PE & GB distension. Started heparin > eliquis. TTE negative for R heart strain. Per surgery, no surgical intervention, treat w/ ABX  6/3/24: Started Futibatinib 12 mg 6/24/24: Increased Futibatinib 16 mg 7/23/24: futibatinib on hold d/t symptomatic hypotension  7/31/24: resumed futibatinib 12 mg po QD 8/29/24: CT CAP: interval decrease in overall tumor burden with right hepatic lobe, interval decrease in LAD. No new sites of disease 12/2/24: CT CAP : Ill-defined confluent hypodense hepatic lesions within the right hepatic lobe measure smaller compared with the prior exam. A previously visualized 1.0 cm arterially enhancing lesion within segment 3 is not definitively seen. Other scattered small hypodense lesions are not significant changed. Abdominal and retroperitoneal lymphadenopathy is not significantly changed.  [de-identified] : adenocarcinoma, moderately differentiated  [de-identified] : HCIO TMB 1   FGFR2 FGFR2-SORBS1 fusion IDH2 R172K CBL C416S - subclonal SETD2 [id number]*22 TERT promoter -124C> [de-identified] : Cramping at night  Nipples are sensitive  hands are dry, nails are better since starting steroid cream.  good appetite. cont to work full time

## 2024-12-16 NOTE — REVIEW OF SYSTEMS
[Diarrhea: Grade 0] : Diarrhea: Grade 0 [Muscle Weakness] : muscle weakness [Negative] : Allergic/Immunologic [de-identified] : + PN

## 2025-01-13 NOTE — HISTORY OF PRESENT ILLNESS
[de-identified] : 68 M with poor medical follow up, HTN, untreated Hepatitis C presents for management of newly diagnosed cholagiocarcinoma, FGFR2 fusion.   Hong presented to [facility] on 8/10/23 with UTI symptoms, FTT with significant weight loss.  He repors 2-3 mos of progressive weakness, unintentional weight loss of 60 lbs in 2-3 yrs.  CT CAP 8/10/23 showed Large and ill-defined hepatic hypodensity involving a large portion of the right hepatic lobe, retroperitoneal lymphadenopathy. MRI Abd on 8/11/23 with findings are suspicious for multifocal intrahepatic cholangiocarcinoma, with the dominant mass in the right hepatic lobe measuring up to 11.8 cm. Retroperitoneal, periportal and gastric hepatic lymphadenopathy is favored to be metastatic, IR performed liver biopsy 8/14/23 c/w adenocarcinoma, moderately differentiated, IHC stains + CK7,  CK19. Patient found to be + hepatitis C.   PET/CT on 8/22/23  showed FDG-avid dominant lesion in right hepatic lobe and multiple small FDG-avid bilobar hepatic lesions correspond to biopsy-proven malignancy. FDG-avid retroperitoneal and inferior preesophageal lymphadenopathy. FDG-avid focus in upper rectum with questionable corresponding 2 cm soft tissue density on CT, is concerning for neoplasm. Nonspecific, mild hypermetabolism in fundus of stomach may reflect inflammation.   Referred to medical oncology.   9/8/23: Colonoscopy: large rectal polyp resected, benign 9/11/23: C1D1 Gemzar/Cisplatin/Durvalumab  9/18/23: C1D8 10/2/23:C2D1 10/9/23: C2D8 c/b cisplatin rxn with back pain, radiating to chest, hypertensive. Reaction occurred after Cisplatin infusion. Cisplatin d/c'ed 10/19/23: CT CAP: no change in hepatic lesions and RP nodes 10/23/23: C3D1 [place]/Durva alone 10/30/23 C3D8 discussed with pt, concern that Gemzar/Durva alone will not be enough to control disease. Recommendation to add Abraxane. Will d/c Durvalumab as no benefit known with combining with Abraxane.  11/13-1/22/24: C1-3D15 Gemzar/Abraxane 12/28/23: CT C/A/P - stable hepatic lesions and RP LAD 1/29/24: EGD: portal gastropathy 2/5-2/9/14: Admitted to [facility] with melena (2/5 tx held), found to have duodenal variceal bleed s/p obliteration. CT A/P 2/5/24 noted continued liver mass with satellite lesions, difficult to compare to prior scan 2/16/24: US Para: minimal ascites, 50 cc removed c/w portal HTN, neg cytology  3/4/24: resumed Gemzar 800 mg/m2 + Abraxane 100 mg/m2, C4D1 3/18/24: C4D15 4/1/24: C5D1 4/8/24: CT CAP: SD 4/15/24: C5D15 4/29/24: C6D1 5/31/24: CT CAP: incidental RUL segmental PE. Large infiltrative right hepatic lobe mass grossly stable. Multiple satellite nodules in the liver + periportal & retroperitoneal LAD stable. Increasing GB distension w/ increased RUQ & pericholecystic fluid.  5/31/24-6/2/24: Admitted for management of PE & GB distension. Started heparin > eliquis. TTE negative for R heart strain. Per surgery, no surgical intervention, treat w/ ABX  6/3/24: Started Futibatinib 12 mg 6/24/24: Increased Futibatinib 16 mg 7/23/24: futibatinib on hold d/t symptomatic hypotension  7/31/24: resumed futibatinib 12 mg po QD 8/29/24: CT CAP: interval decrease in overall tumor burden with right hepatic lobe, interval decrease in LAD. No new sites of disease 12/2/24: CT CAP : Ill-defined confluent hypodense hepatic lesions within the right hepatic lobe measure smaller compared with the prior exam. A previously visualized 1.0 cm arterially enhancing lesion within segment 3 is not definitively seen. Other scattered small hypodense lesions are not significant changed. Abdominal and retroperitoneal lymphadenopathy is not significantly changed.  [de-identified] : adenocarcinoma, moderately differentiated  [de-identified] : CHIO TMB 1   FGFR2 FGFR2-SORBS1 fusion IDH2 R172K CBL C416S - subclonal SETD2 [id number]*22 TERT promoter -124C> [de-identified] : neuropathy still bothersome in the feet. Could not tolerate Cymbalta due to side effects.  feels unstable at times, no further falls.  hands are red, a bit swollen. Has difficulty opening jars, etc.  good appetite. BP has been stable.

## 2025-01-13 NOTE — HISTORY OF PRESENT ILLNESS
[de-identified] : 68 M with poor medical follow up, HTN, untreated Hepatitis C presents for management of newly diagnosed cholagiocarcinoma, FGFR2 fusion.   Hong presented to [facility] on 8/10/23 with UTI symptoms, FTT with significant weight loss.  He repors 2-3 mos of progressive weakness, unintentional weight loss of 60 lbs in 2-3 yrs.  CT CAP 8/10/23 showed Large and ill-defined hepatic hypodensity involving a large portion of the right hepatic lobe, retroperitoneal lymphadenopathy. MRI Abd on 8/11/23 with findings are suspicious for multifocal intrahepatic cholangiocarcinoma, with the dominant mass in the right hepatic lobe measuring up to 11.8 cm. Retroperitoneal, periportal and gastric hepatic lymphadenopathy is favored to be metastatic, IR performed liver biopsy 8/14/23 c/w adenocarcinoma, moderately differentiated, IHC stains + CK7,  CK19. Patient found to be + hepatitis C.   PET/CT on 8/22/23  showed FDG-avid dominant lesion in right hepatic lobe and multiple small FDG-avid bilobar hepatic lesions correspond to biopsy-proven malignancy. FDG-avid retroperitoneal and inferior preesophageal lymphadenopathy. FDG-avid focus in upper rectum with questionable corresponding 2 cm soft tissue density on CT, is concerning for neoplasm. Nonspecific, mild hypermetabolism in fundus of stomach may reflect inflammation.   Referred to medical oncology.   9/8/23: Colonoscopy: large rectal polyp resected, benign 9/11/23: C1D1 Gemzar/Cisplatin/Durvalumab  9/18/23: C1D8 10/2/23:C2D1 10/9/23: C2D8 c/b cisplatin rxn with back pain, radiating to chest, hypertensive. Reaction occurred after Cisplatin infusion. Cisplatin d/c'ed 10/19/23: CT CAP: no change in hepatic lesions and RP nodes 10/23/23: C3D1 [place]/Durva alone 10/30/23 C3D8 discussed with pt, concern that Gemzar/Durva alone will not be enough to control disease. Recommendation to add Abraxane. Will d/c Durvalumab as no benefit known with combining with Abraxane.  11/13-1/22/24: C1-3D15 Gemzar/Abraxane 12/28/23: CT C/A/P - stable hepatic lesions and RP LAD 1/29/24: EGD: portal gastropathy 2/5-2/9/14: Admitted to [facility] with melena (2/5 tx held), found to have duodenal variceal bleed s/p obliteration. CT A/P 2/5/24 noted continued liver mass with satellite lesions, difficult to compare to prior scan 2/16/24: US Para: minimal ascites, 50 cc removed c/w portal HTN, neg cytology  3/4/24: resumed Gemzar 800 mg/m2 + Abraxane 100 mg/m2, C4D1 3/18/24: C4D15 4/1/24: C5D1 4/8/24: CT CAP: SD 4/15/24: C5D15 4/29/24: C6D1 5/31/24: CT CAP: incidental RUL segmental PE. Large infiltrative right hepatic lobe mass grossly stable. Multiple satellite nodules in the liver + periportal & retroperitoneal LAD stable. Increasing GB distension w/ increased RUQ & pericholecystic fluid.  5/31/24-6/2/24: Admitted for management of PE & GB distension. Started heparin > eliquis. TTE negative for R heart strain. Per surgery, no surgical intervention, treat w/ ABX  6/3/24: Started Futibatinib 12 mg 6/24/24: Increased Futibatinib 16 mg 7/23/24: futibatinib on hold d/t symptomatic hypotension  7/31/24: resumed futibatinib 12 mg po QD 8/29/24: CT CAP: interval decrease in overall tumor burden with right hepatic lobe, interval decrease in LAD. No new sites of disease 12/2/24: CT CAP : Ill-defined confluent hypodense hepatic lesions within the right hepatic lobe measure smaller compared with the prior exam. A previously visualized 1.0 cm arterially enhancing lesion within segment 3 is not definitively seen. Other scattered small hypodense lesions are not significant changed. Abdominal and retroperitoneal lymphadenopathy is not significantly changed.  [de-identified] : adenocarcinoma, moderately differentiated  [de-identified] : CHIO TMB 1   FGFR2 FGFR2-SORBS1 fusion IDH2 R172K CBL C416S - subclonal SETD2 [id number]*22 TERT promoter -124C> [de-identified] : neuropathy still bothersome in the feet. Could not tolerate Cymbalta due to side effects.  feels unstable at times, no further falls.  hands are red, a bit swollen. Has difficulty opening jars, etc.  good appetite. BP has been stable.

## 2025-02-10 NOTE — PHYSICAL EXAM
[Restricted in physically strenuous activity but ambulatory and able to carry out work of a light or sedentary nature] : Status 1- Restricted in physically strenuous activity but ambulatory and able to carry out work of a light or sedentary nature, e.g., light house work, office work [Thin] : thin [Normal] : well developed, well nourished, in no acute distress [de-identified] : + dryness, swelling of hands and desquemation involving R plantar aspect of foot

## 2025-02-10 NOTE — REVIEW OF SYSTEMS
[Diarrhea: Grade 0] : Diarrhea: Grade 0 [Muscle Weakness] : muscle weakness [Negative] : Allergic/Immunologic [Skin Rash] : skin rash [Difficulty Walking] : difficulty walking [de-identified] : + PN

## 2025-02-10 NOTE — HISTORY OF PRESENT ILLNESS
[Disease: _____________________] : Disease: [unfilled] [AJCC Stage: ____] : AJCC Stage: [unfilled] [Therapy: ___] : Therapy: [unfilled] [de-identified] : 68 M with poor medical follow up, HTN, untreated Hepatitis C presents for management of newly diagnosed cholagiocarcinoma, FGFR2 fusion.   Hong presented to [facility] on 8/10/23 with UTI symptoms, FTT with significant weight loss.  He repors 2-3 mos of progressive weakness, unintentional weight loss of 60 lbs in 2-3 yrs.  CT CAP 8/10/23 showed Large and ill-defined hepatic hypodensity involving a large portion of the right hepatic lobe, retroperitoneal lymphadenopathy. MRI Abd on 8/11/23 with findings are suspicious for multifocal intrahepatic cholangiocarcinoma, with the dominant mass in the right hepatic lobe measuring up to 11.8 cm. Retroperitoneal, periportal and gastric hepatic lymphadenopathy is favored to be metastatic, IR performed liver biopsy 8/14/23 c/w adenocarcinoma, moderately differentiated, IHC stains + CK7,  CK19. Patient found to be + hepatitis C.   PET/CT on 8/22/23  showed FDG-avid dominant lesion in right hepatic lobe and multiple small FDG-avid bilobar hepatic lesions correspond to biopsy-proven malignancy. FDG-avid retroperitoneal and inferior preesophageal lymphadenopathy. FDG-avid focus in upper rectum with questionable corresponding 2 cm soft tissue density on CT, is concerning for neoplasm. Nonspecific, mild hypermetabolism in fundus of stomach may reflect inflammation.   Referred to medical oncology.   9/8/23: Colonoscopy: large rectal polyp resected, benign 9/11/23: C1D1 Gemzar/Cisplatin/Durvalumab  9/18/23: C1D8 10/2/23:C2D1 10/9/23: C2D8 c/b cisplatin rxn with back pain, radiating to chest, hypertensive. Reaction occurred after Cisplatin infusion. Cisplatin d/c'ed 10/19/23: CT CAP: no change in hepatic lesions and RP nodes 10/23/23: C3D1 [place]/Durva alone 10/30/23 C3D8 discussed with pt, concern that Gemzar/Durva alone will not be enough to control disease. Recommendation to add Abraxane. Will d/c Durvalumab as no benefit known with combining with Abraxane.  11/13-1/22/24: C1-3D15 Gemzar/Abraxane 12/28/23: CT C/A/P - stable hepatic lesions and RP LAD 1/29/24: EGD: portal gastropathy 2/5-2/9/14: Admitted to [facility] with melena (2/5 tx held), found to have duodenal variceal bleed s/p obliteration. CT A/P 2/5/24 noted continued liver mass with satellite lesions, difficult to compare to prior scan 2/16/24: US Para: minimal ascites, 50 cc removed c/w portal HTN, neg cytology  3/4/24: resumed Gemzar 800 mg/m2 + Abraxane 100 mg/m2, C4D1 3/18/24: C4D15 4/1/24: C5D1 4/8/24: CT CAP: SD 4/15/24: C5D15 4/29/24: C6D1 5/31/24: CT CAP: incidental RUL segmental PE. Large infiltrative right hepatic lobe mass grossly stable. Multiple satellite nodules in the liver + periportal & retroperitoneal LAD stable. Increasing GB distension w/ increased RUQ & pericholecystic fluid.  5/31/24-6/2/24: Admitted for management of PE & GB distension. Started heparin > eliquis. TTE negative for R heart strain. Per surgery, no surgical intervention, treat w/ ABX  6/3/24: Started Futibatinib 12 mg 6/24/24: Increased Futibatinib 16 mg 7/23/24: futibatinib on hold d/t symptomatic hypotension  7/31/24: resumed futibatinib 12 mg po QD 8/29/24: CT CAP: interval decrease in overall tumor burden with right hepatic lobe, interval decrease in LAD. No new sites of disease 12/2/24: CT CAP : Ill-defined confluent hypodense hepatic lesions within the right hepatic lobe measure smaller compared with the prior exam. A previously visualized 1.0 cm arterially enhancing lesion within segment 3 is not definitively seen. Other scattered small hypodense lesions are not significant changed. Abdominal and retroperitoneal lymphadenopathy is not significantly changed.  [de-identified] : adenocarcinoma, moderately differentiated  [de-identified] : CHIO TMB 1   FGFR2 FGFR2-SORBS1 fusion IDH2 R172K CBL C416S - subclonal SETD2 [id number] TERT promoter -124C> [de-identified] : HFS has been bothering him more this last month. At the end of the day has difficulty with performing fine motor movements. Hands are swollen and painful. Using steroid cream once a day on feet, on hands b/l. Using aquaphor twice a day.  Lyrica was helping initially but now not as much. does make him sleepy at night.  had a couple of nose bleeds that resolved  eating and drinking well  no edema but has cramps at night so eliminated evening dose of diuretics needs refills

## 2025-02-10 NOTE — PHYSICAL EXAM
[Restricted in physically strenuous activity but ambulatory and able to carry out work of a light or sedentary nature] : Status 1- Restricted in physically strenuous activity but ambulatory and able to carry out work of a light or sedentary nature, e.g., light house work, office work [Thin] : thin [Normal] : well developed, well nourished, in no acute distress [de-identified] : + dryness, swelling of hands and desquemation involving R plantar aspect of foot

## 2025-02-10 NOTE — REVIEW OF SYSTEMS
[Diarrhea: Grade 0] : Diarrhea: Grade 0 [Muscle Weakness] : muscle weakness [Negative] : Allergic/Immunologic [Skin Rash] : skin rash [Difficulty Walking] : difficulty walking [de-identified] : + PN

## 2025-02-10 NOTE — HISTORY OF PRESENT ILLNESS
[Disease: _____________________] : Disease: [unfilled] [AJCC Stage: ____] : AJCC Stage: [unfilled] [Therapy: ___] : Therapy: [unfilled] [de-identified] : 68 M with poor medical follow up, HTN, untreated Hepatitis C presents for management of newly diagnosed cholagiocarcinoma, FGFR2 fusion.   Hong presented to [facility] on 8/10/23 with UTI symptoms, FTT with significant weight loss.  He repors 2-3 mos of progressive weakness, unintentional weight loss of 60 lbs in 2-3 yrs.  CT CAP 8/10/23 showed Large and ill-defined hepatic hypodensity involving a large portion of the right hepatic lobe, retroperitoneal lymphadenopathy. MRI Abd on 8/11/23 with findings are suspicious for multifocal intrahepatic cholangiocarcinoma, with the dominant mass in the right hepatic lobe measuring up to 11.8 cm. Retroperitoneal, periportal and gastric hepatic lymphadenopathy is favored to be metastatic, IR performed liver biopsy 8/14/23 c/w adenocarcinoma, moderately differentiated, IHC stains + CK7,  CK19. Patient found to be + hepatitis C.   PET/CT on 8/22/23  showed FDG-avid dominant lesion in right hepatic lobe and multiple small FDG-avid bilobar hepatic lesions correspond to biopsy-proven malignancy. FDG-avid retroperitoneal and inferior preesophageal lymphadenopathy. FDG-avid focus in upper rectum with questionable corresponding 2 cm soft tissue density on CT, is concerning for neoplasm. Nonspecific, mild hypermetabolism in fundus of stomach may reflect inflammation.   Referred to medical oncology.   9/8/23: Colonoscopy: large rectal polyp resected, benign 9/11/23: C1D1 Gemzar/Cisplatin/Durvalumab  9/18/23: C1D8 10/2/23:C2D1 10/9/23: C2D8 c/b cisplatin rxn with back pain, radiating to chest, hypertensive. Reaction occurred after Cisplatin infusion. Cisplatin d/c'ed 10/19/23: CT CAP: no change in hepatic lesions and RP nodes 10/23/23: C3D1 [place]/Durva alone 10/30/23 C3D8 discussed with pt, concern that Gemzar/Durva alone will not be enough to control disease. Recommendation to add Abraxane. Will d/c Durvalumab as no benefit known with combining with Abraxane.  11/13-1/22/24: C1-3D15 Gemzar/Abraxane 12/28/23: CT C/A/P - stable hepatic lesions and RP LAD 1/29/24: EGD: portal gastropathy 2/5-2/9/14: Admitted to [facility] with melena (2/5 tx held), found to have duodenal variceal bleed s/p obliteration. CT A/P 2/5/24 noted continued liver mass with satellite lesions, difficult to compare to prior scan 2/16/24: US Para: minimal ascites, 50 cc removed c/w portal HTN, neg cytology  3/4/24: resumed Gemzar 800 mg/m2 + Abraxane 100 mg/m2, C4D1 3/18/24: C4D15 4/1/24: C5D1 4/8/24: CT CAP: SD 4/15/24: C5D15 4/29/24: C6D1 5/31/24: CT CAP: incidental RUL segmental PE. Large infiltrative right hepatic lobe mass grossly stable. Multiple satellite nodules in the liver + periportal & retroperitoneal LAD stable. Increasing GB distension w/ increased RUQ & pericholecystic fluid.  5/31/24-6/2/24: Admitted for management of PE & GB distension. Started heparin > eliquis. TTE negative for R heart strain. Per surgery, no surgical intervention, treat w/ ABX  6/3/24: Started Futibatinib 12 mg 6/24/24: Increased Futibatinib 16 mg 7/23/24: futibatinib on hold d/t symptomatic hypotension  7/31/24: resumed futibatinib 12 mg po QD 8/29/24: CT CAP: interval decrease in overall tumor burden with right hepatic lobe, interval decrease in LAD. No new sites of disease 12/2/24: CT CAP : Ill-defined confluent hypodense hepatic lesions within the right hepatic lobe measure smaller compared with the prior exam. A previously visualized 1.0 cm arterially enhancing lesion within segment 3 is not definitively seen. Other scattered small hypodense lesions are not significant changed. Abdominal and retroperitoneal lymphadenopathy is not significantly changed.  [de-identified] : adenocarcinoma, moderately differentiated  [de-identified] : CHIO TMB 1   FGFR2 FGFR2-SORBS1 fusion IDH2 [id number] CBL C416S - subclonal SETD2 [id number]*22 TERT promoter -124C> [de-identified] : HFS has been bothering him more this last month. At the end of the day has difficulty with performing fine motor movements. Hands are swollen and painful. Using steroid cream once a day on feet, on hands b/l. Using aquaphor twice a day.  Lyrica was helping initially but now not as much. does make him sleepy at night.  had a couple of nose bleeds that resolved  eating and drinking well  no edema but has cramps at night so eliminated evening dose of diuretics needs refills

## 2025-02-12 NOTE — PHYSICAL EXAM
[FreeTextEntry3] : Focused skin exam performed  The relevant portions of the exam were performed today  AAOx3, NAD, well-appearing / pleasant Focused examination within normal limits with the exception of:  - red patches on the palmar and plantar hands w/ areas of fissuring and mild desquamation

## 2025-02-12 NOTE — HISTORY OF PRESENT ILLNESS
[FreeTextEntry1] : NPV: rash  [de-identified] : MARY KEARNS is a 69 year old w/ PMH of HTN, untreated Hepatitis C, and cholagiocarcinoma who is presenting for evaluation of:   1. Rash on the hands and feet x months, painful, using betamethasone cream and urea cream daily x 4-5 months, may go down or take a break on Futibatinib based on upcoming scans  per onc note:  s/p C3 Gemzar/Durvalumab c/b Cisplatin reaction--> switched to Gemzar/Abraxane Q 2 weeks     11/13/23- 4/29/24, d/c due to AEs, started Futibatinib 6/3/24 (12 mg), increased to 16     mg 6/24/24, held 7/23/24 due to hypotension, now restarted at 12 mg on 7/31/24  On lyrica for neuropathy as well

## 2025-02-12 NOTE — ASSESSMENT
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 - c/w urea 40% daily. SED  RTC 2 mo

## 2025-03-09 NOTE — PLAN
[TextEntry] : PLAN # Chronic Hepatitis C: - I have discussed with him at length regarding hepatitis C. I reviewed the natural history, modes of transmission, evaluation, prognosis, and possible treatment. Genotype 2.  -Given improvements in his recent imaging studies, we started Epclusa for 24 week treatment at the end of Sept 2024; he is set to complete treatment within the next 2 weeks. Will plan for repeat labs with Hep C RNA today and in 3 months before follow up.   #Variceal Screening/Duodenal ulcers -s/p EGD 5/20/2024: There is no endoscopic evidence of esophagitis, ulcerations or varices in the entire esophagus. Mild portal hypertensive gastropathy was found in the gastric fundus and in the gastric body. A large (> 5 mm) varix was found in the proximal second portion of the duodenum. It was 6 mm in diameter. The second distal varix which was injected with glue appears obliterated with no residual ulceration.  Plan for repeat in 1 year - will order at next follow up.   # Ascites: -Serum-ascites albumin gradient (SAAG) indicates portal hypertension. Ascitic fluid negative for malignant cells (Feb 16, 2024). Mr. KEARNS was counseled to adhere to a low sodium (<2 grams of sodium per day) diet by: avoiding adding any salt to meals (removing the salt shaker from the table); eliminating salty foods from his diet; eating more home-cooked meals; choosing fresh or frozen, not canned, vegetables and fruits; and reading ingredient and food labels to choose low sodium foods. -Patient reports no recurrence of ascites and reports being off of all diuretics. Advised to take Furosemide 20mg daily PRN.  #Hypotension -resolved; patient reports being off of Midodrine.   #Hepatic Encephalopathy -Patient is still taking Lactulose after last admission, HE symptoms have improved.  -We reviewed that the patient can self titrate dosing of Lactulose based on constipation symptoms, etc. He reports also taking Miralax and Colace to help treat constipation as well.   #Constipation -Continue Miralax / lactulose   # Multifocal Cholangiocarcinoma with metastases: -Management of the cholangiocarcinoma will be driven Dr. Kiser, Medical Oncologist.  -The patient received a combination of Gemzar, Cisplatin, and Durvalumab initially, followed by Gemzar/Durvalumab alone, and later Gemzar/Abraxane due to concerns about disease control. Durvalumab was discontinued, and stable hepatic lesions were observed on subsequent scans, though a duodenal variceal bleed occurred during treatment, and now treatment plan is being re-assessed. Further he developed ascites, s/p LVP x 1. Ascites now resolved and he is off diuretics. He will follow up with Dr. Kiser with regards to CCA management. He is scheduled for repeat imaging next week.  -He was started on Entecavir to protect against HBV reactivation - this dose was increased due to previous decompensation and he is currently taking 1mg daily.   RTC in 3 months with repeat labs today and before RPA.   The patient was examined, and the treatment plan was reviewed in consultation with Dr. Mao.  Bushra Hensley, MSN, FNP-BC Transplant Hepatology Nurse Practitioner [facility] [address] [place] [address] T: [phone] | F: [phone].

## 2025-03-09 NOTE — PLAN
[TextEntry] : PLAN # Chronic Hepatitis C: - I have discussed with him at length regarding hepatitis C. I reviewed the natural history, modes of transmission, evaluation, prognosis, and possible treatment. Genotype 2.  -Given improvements in his recent imaging studies, we started Epclusa for 24 week treatment at the end of Sept 2024; he is set to complete treatment within the next 2 weeks. Will plan for repeat labs with Hep C RNA today and in 3 months before follow up.   #Variceal Screening/Duodenal ulcers -s/p EGD 5/20/2024: There is no endoscopic evidence of esophagitis, ulcerations or varices in the entire esophagus. Mild portal hypertensive gastropathy was found in the gastric fundus and in the gastric body. A large (> 5 mm) varix was found in the proximal second portion of the duodenum. It was 6 mm in diameter. The second distal varix which was injected with glue appears obliterated with no residual ulceration.  Plan for repeat in 1 year - will order at next follow up.   # Ascites: -Serum-ascites albumin gradient (SAAG) indicates portal hypertension. Ascitic fluid negative for malignant cells (Feb 16, 2024). Mr. KEARNS was counseled to adhere to a low sodium (<2 grams of sodium per day) diet by: avoiding adding any salt to meals (removing the salt shaker from the table); eliminating salty foods from his diet; eating more home-cooked meals; choosing fresh or frozen, not canned, vegetables and fruits; and reading ingredient and food labels to choose low sodium foods. -Patient reports no recurrence of ascites and reports being off of all diuretics. Advised to take Furosemide 20mg daily PRN.  #Hypotension -resolved; patient reports being off of Midodrine.   #Hepatic Encephalopathy -Patient is still taking Lactulose after last admission, HE symptoms have improved.  -We reviewed that the patient can self titrate dosing of Lactulose based on constipation symptoms, etc. He reports also taking Miralax and Colace to help treat constipation as well.   #Constipation -Continue Miralax / lactulose   # Multifocal Cholangiocarcinoma with metastases: -Management of the cholangiocarcinoma will be driven Dr. Kiser, Medical Oncologist.  -The patient received a combination of Gemzar, Cisplatin, and Durvalumab initially, followed by Gemzar/Durvalumab alone, and later Gemzar/Abraxane due to concerns about disease control. Durvalumab was discontinued, and stable hepatic lesions were observed on subsequent scans, though a duodenal variceal bleed occurred during treatment, and now treatment plan is being re-assessed. Further he developed ascites, s/p LVP x 1. Ascites now resolved and he is off diuretics. He will follow up with Dr. Kiser with regards to CCA management. He is scheduled for repeat imaging next week.  -He was started on Entecavir to protect against HBV reactivation - this dose was increased due to previous decompensation and he is currently taking 1mg daily.   RTC in 3 months with repeat labs today and before RPA.   The patient was examined, and the treatment plan was reviewed in consultation with Dr. Mao.  Bushra eHnsley, MSN, FNP-BC Transplant Hepatology Nurse Practitioner [facility] [address] [place] [address] T: [phone] | F: [phone].

## 2025-03-09 NOTE — ASSESSMENT
[FreeTextEntry1] : Mr. MARY KEARNS a 69-year-old  male, with poor medical follow-up, HTN, and untreated Hepatitis C, was recently diagnosed with cholangiocarcinoma by Dr. Hilda Kiser. MRI (August 11, 2023) found an 11.8 cm dominant mass in the right hepatic lobe, potential metastasis. Liver biopsy (August 14, 2023) confirmed moderately differentiated adenocarcinoma (CK7 and CK19 positive). PET/CT (August 22, 2023) showed FDG-avid lesions in the liver, lymph nodes, and upper rectum, with mild stomach fundus hypermetabolism. AFP <1.8 on August 10, 2023. The patient received a combination of Gemzar, Cisplatin, and Durvalumab initially, followed by Gemzar/Durvalumab alone, and later Gemzar/Abraxane due to concerns about disease control. Durvalumab was discontinued, and stable hepatic lesions were observed on subsequent scans, though a duodenal variceal bleed occurred during treatment. Course has been further complicated with ascites, s/p LVP x 1. Ascites now well controlled off diuretics. He was started on Epclusa treatment at the end of Sept 2024 for a 24 week therapy. He is nearing completion of HCV treatment and repeat imaging is planned. Currently, his ascites and blood pressure are improved, allowing for discontinuation of midodrine and furosemide.  He reports improved overall well-being. Ongoing monitoring of his cholangiocarcinoma, assessment of HCV treatment response, and management of complications related to portal hypertension remain essential.

## 2025-03-09 NOTE — PLAN
[TextEntry] : PLAN # Chronic Hepatitis C: - I have discussed with him at length regarding hepatitis C. I reviewed the natural history, modes of transmission, evaluation, prognosis, and possible treatment. Genotype 2.  -Given improvements in his recent imaging studies, we started Epclusa for 24 week treatment at the end of Sept 2024; he is set to complete treatment within the next 2 weeks. Will plan for repeat labs with Hep C RNA today and in 3 months before follow up.   #Variceal Screening/Duodenal ulcers -s/p EGD 5/20/2024: There is no endoscopic evidence of esophagitis, ulcerations or varices in the entire esophagus. Mild portal hypertensive gastropathy was found in the gastric fundus and in the gastric body. A large (> 5 mm) varix was found in the proximal second portion of the duodenum. It was 6 mm in diameter. The second distal varix which was injected with glue appears obliterated with no residual ulceration.  Plan for repeat in 1 year - will order at next follow up.   # Ascites: -Serum-ascites albumin gradient (SAAG) indicates portal hypertension. Ascitic fluid negative for malignant cells (Feb 16, 2024). Mr. KEARNS was counseled to adhere to a low sodium (<2 grams of sodium per day) diet by: avoiding adding any salt to meals (removing the salt shaker from the table); eliminating salty foods from his diet; eating more home-cooked meals; choosing fresh or frozen, not canned, vegetables and fruits; and reading ingredient and food labels to choose low sodium foods. -Patient reports no recurrence of ascites and reports being off of all diuretics. Advised to take Furosemide 20mg daily PRN.  #Hypotension -resolved; patient reports being off of Midodrine.   #Hepatic Encephalopathy -Patient is still taking Lactulose after last admission, HE symptoms have improved.  -We reviewed that the patient can self titrate dosing of Lactulose based on constipation symptoms, etc. He reports also taking Miralax and Colace to help treat constipation as well.   #Constipation -Continue Miralax / lactulose   # Multifocal Cholangiocarcinoma with metastases: -Management of the cholangiocarcinoma will be driven Dr. Kiser, Medical Oncologist.  -The patient received a combination of Gemzar, Cisplatin, and Durvalumab initially, followed by Gemzar/Durvalumab alone, and later Gemzar/Abraxane due to concerns about disease control. Durvalumab was discontinued, and stable hepatic lesions were observed on subsequent scans, though a duodenal variceal bleed occurred during treatment, and now treatment plan is being re-assessed. Further he developed ascites, s/p LVP x 1. Ascites now resolved and he is off diuretics. He will follow up with Dr. Kiser with regards to CCA management. He is scheduled for repeat imaging next week.  -He was started on Entecavir to protect against HBV reactivation - this dose was increased due to previous decompensation and he is currently taking 1mg daily.   RTC in 3 months with repeat labs today and before RPA.   The patient was examined, and the treatment plan was reviewed in consultation with Dr. Mao.  Bushra Hensley, MSN, FNP-BC Transplant Hepatology Nurse Practitioner [facility] [address] [place] [address] T: [phone] | F: [phone].

## 2025-03-09 NOTE — END OF VISIT
[Time Spent: ___ minutes] : I have spent [unfilled] minutes of time on the encounter which excludes teaching and separately reported services. [FreeTextEntry3] : I saw and evaluated the patient with NP Ayden.  I discussed the care of this patient with the NP providing the service, and was directly responsible for the patient's management. My discussion with the NP included the patient's history, physical exam, laboratory findings, and medical decision-making. I agree with the documented findings and plan of care of the NP's note. Spent > 30 minutes collectively in reviewing records, performing patient history and physical examination, and formulating recommendations/plan of care.

## 2025-03-09 NOTE — PHYSICAL EXAM
[General Appearance - Alert] : alert [General Appearance - In No Acute Distress] : in no acute distress [Sclera] : the sclera and conjunctiva were normal [PERRL With Normal Accommodation] : pupils were equal in size, round, and reactive to light [Extraocular Movements] : extraocular movements were intact [Outer Ear] : the ears and nose were normal in appearance [Oropharynx] : the oropharynx was normal [Neck Appearance] : the appearance of the neck was normal [Neck Cervical Mass (___cm)] : no neck mass was observed [Jugular Venous Distention Increased] : there was no jugular-venous distention [Thyroid Diffuse Enlargement] : the thyroid was not enlarged [Thyroid Nodule] : there were no palpable thyroid nodules [] : no respiratory distress [Auscultation Breath Sounds / Voice Sounds] : lungs were clear to auscultation bilaterally [Heart Rate And Rhythm] : heart rate was normal and rhythm regular [Heart Sounds] : normal S1 and S2 [Murmurs] : no murmurs [Heart Sounds Gallop] : no gallops [Heart Sounds Pericardial Friction Rub] : no pericardial rub [Bowel Sounds] : normal bowel sounds [Abdomen Soft] : soft [Abdomen Tenderness] : non-tender [Abdomen Mass (___ Cm)] : no abdominal mass palpated [Abnormal Walk] : normal gait [Nail Clubbing] : no clubbing  or cyanosis of the fingernails [Musculoskeletal - Swelling] : no joint swelling seen [Motor Tone] : muscle strength and tone were normal [Deep Tendon Reflexes (DTR)] : deep tendon reflexes were 2+ and symmetric [Sensation] : the sensory exam was normal to light touch and pinprick [No Focal Deficits] : no focal deficits [Oriented To Time, Place, And Person] : oriented to person, place, and time [Impaired Insight] : insight and judgment were intact [Affect] : the affect was normal [FreeTextEntry1] : Liver 2-3 cm under the RCM, No ascites

## 2025-03-09 NOTE — HISTORY OF PRESENT ILLNESS
[FreeTextEntry1] : Mr. Hong Gonzalez, a 69-year-old male, presents for hepatology follow-up.  He has a history of inconsistent medical care, hypertension, and untreated hepatitis C virus (HCV) infection, with a viral load of 148,144 IU/mL incidentally detected during a workup for suspected cholangiocarcinoma. His wife has a history of treated and cured HCV.  Mr. Gonzalez initially presented to the hospital in August 2023 with symptoms suggestive of a urinary tract infection, significant weight loss, and failure to thrive.  He reported a progressive decline in his overall health over the preceding 2-3 months and unintentional weight loss of approximately 60 pounds over the past 2-3 years. Imaging revealed a large, ill-defined hypodensity in the right hepatic lobe and retroperitoneal lymphadenopathy.  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  A liver biopsy confirmed moderately differentiated adenocarcinoma, positive for CK7 and CK19.  PET/CT confirmed the hepatic lesions and lymphadenopathy, also identifying a concerning area in the rectum.  A flex sigmoidoscopy revealed rectal polyps; pathology is pending, and a colonoscopy is planned.  His alpha-fetoprotein (AFP) level at that time was <1.8 ng/mL.  Between October and December 2023, imaging showed stable disease.  An esophagogastroduodenoscopy (EGD) revealed portal hypertensive gastropathy and two small duodenal nodules; biopsy of one resulted in delayed hematemesis and melena.  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  Despite this, new-onset ascites developed, requiring large-volume paracentesis.  Diuretic therapy (furosemide and spironolactone) was initiated.    Throughout early 2024, Mr. Gonzalez experienced intermittent abdominal pain and nausea, which resolved.  His ascites responded to diuretics.  He resumed chemotherapy with gemcitabine and abraxane at reduced doses due to fatigue and portal hypertension. He uses medical marijuana for nausea.  Liver function tests remained mildly elevated. An EGD was postponed due to concurrent chemotherapy scheduling.  In June 2024, he was hospitalized for suspected acute cholecystitis, which proved negative. However, a pulmonary embolism was diagnosed and treated with anticoagulation.  Hepatic encephalopathy was also noted during this admission, and lactulose was initiated.  He transitioned to oral chemotherapy with futibatinib, which exacerbated constipation.  In July 2024, Mr. Gonzalez reported nausea and decreased appetite, potentially related to midodrine and a phosphate binder started for hypotension.  By August 2024, his hypotension had resolved, and his CA 19-9 was trending downward.  He continued diuretics and a reduced dose of midodrine.  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  He also expressed interest in HCV treatment.  He initiated Epclusa for HCV in October 2024 and tolerated it well.  By November 2024, he felt well overall, and liver tests had normalized, with improvement in CA 19-9.  As of March 2025, he reported improvement in blood pressure and ascites, leading to discontinuation of midodrine and furosemide. He is nearing completion of HCV therapy and has upcoming repeat imaging.  Sustained virologic response (SVR) labs are planned.

## 2025-03-26 NOTE — HISTORY OF PRESENT ILLNESS
[Disease: _____________________] : Disease: [unfilled] [AJCC Stage: ____] : AJCC Stage: [unfilled] [Therapy: ___] : Therapy: [unfilled] [de-identified] : 68 M with poor medical follow up, HTN, untreated Hepatitis C presents for management of newly diagnosed cholagiocarcinoma, FGFR2 fusion.   Hogn presented to [facility] on 8/10/23 with UTI symptoms, FTT with significant weight loss.  He repors 2-3 mos of progressive weakness, unintentional weight loss of 60 lbs in 2-3 yrs.  CT CAP 8/10/23 showed Large and ill-defined hepatic hypodensity involving a large portion of the right hepatic lobe, retroperitoneal lymphadenopathy. MRI Abd on 8/11/23 with findings are suspicious for multifocal intrahepatic cholangiocarcinoma, with the dominant mass in the right hepatic lobe measuring up to 11.8 cm. Retroperitoneal, periportal and gastric hepatic lymphadenopathy is favored to be metastatic, IR performed liver biopsy 8/14/23 c/w adenocarcinoma, moderately differentiated, IHC stains + CK7,  CK19. Patient found to be + hepatitis C.   PET/CT on 8/22/23  showed FDG-avid dominant lesion in right hepatic lobe and multiple small FDG-avid bilobar hepatic lesions correspond to biopsy-proven malignancy. FDG-avid retroperitoneal and inferior preesophageal lymphadenopathy. FDG-avid focus in upper rectum with questionable corresponding 2 cm soft tissue density on CT, is concerning for neoplasm. Nonspecific, mild hypermetabolism in fundus of stomach may reflect inflammation.   Referred to medical oncology.   9/8/23: Colonoscopy: large rectal polyp resected, benign 9/11/23: C1D1 Gemzar/Cisplatin/Durvalumab  9/18/23: C1D8 10/2/23:C2D1 10/9/23: C2D8 c/b cisplatin rxn with back pain, radiating to chest, hypertensive. Reaction occurred after Cisplatin infusion. Cisplatin d/c'ed 10/19/23: CT CAP: no change in hepatic lesions and RP nodes 10/23/23: C3D1 [place]/Durva alone 10/30/23 C3D8 discussed with pt, concern that Gemzar/Durva alone will not be enough to control disease. Recommendation to add Abraxane. Will d/c Durvalumab as no benefit known with combining with Abraxane.  11/13-1/22/24: C1-3D15 Gemzar/Abraxane 12/28/23: CT C/A/P - stable hepatic lesions and RP LAD 1/29/24: EGD: portal gastropathy 2/5-2/9/14: Admitted to [facility] with melena (2/5 tx held), found to have duodenal variceal bleed s/p obliteration. CT A/P 2/5/24 noted continued liver mass with satellite lesions, difficult to compare to prior scan 2/16/24: US Para: minimal ascites, 50 cc removed c/w portal HTN, neg cytology  3/4/24: resumed Gemzar 800 mg/m2 + Abraxane 100 mg/m2, C4D1 3/18/24: C4D15 4/1/24: C5D1 4/8/24: CT CAP: SD 4/15/24: C5D15 4/29/24: C6D1 5/31/24: CT CAP: incidental RUL segmental PE. Large infiltrative right hepatic lobe mass grossly stable. Multiple satellite nodules in the liver + periportal & retroperitoneal LAD stable. Increasing GB distension w/ increased RUQ & pericholecystic fluid.  5/31/24-6/2/24: Admitted for management of PE & GB distension. Started heparin > eliquis. TTE negative for R heart strain. Per surgery, no surgical intervention, treat w/ ABX  6/3/24: Started Futibatinib 12 mg 6/24/24: Increased Futibatinib 16 mg 7/23/24: futibatinib on hold d/t symptomatic hypotension  7/31/24: resumed futibatinib 12 mg po QD 8/29/24: CT CAP: interval decrease in overall tumor burden with right hepatic lobe, interval decrease in LAD. No new sites of disease 12/2/24: CT CAP : Ill-defined confluent hypodense hepatic lesions within the right hepatic lobe measure smaller compared with the prior exam. A previously visualized 1.0 cm arterially enhancing lesion within segment 3 is not definitively seen. Other scattered small hypodense lesions are not significant changed. Abdominal and retroperitoneal lymphadenopathy is not significantly changed. 3/10/25: CT CAP: Dominant lesion in the right hepatic lobe stable to mildly decreased. New 1 cm subcapsular lesion in segment 7. Small volume upper abdominal/retroperitoneal adenopathy without change [de-identified] : adenocarcinoma, moderately differentiated  [de-identified] : CHIO TMB 1   FGFR2 FGFR2-SORBS1 fusion IDH2 [id number] CBL C416S - subclonal SETD2 [id number]*22 TERT promoter -[id number]> [de-identified] : Here with his daughter for clinical follow up  Neuropathy still bothersome. Stopped acupuncture. Taking Lyrica qHS  Otherwise, no new complaints

## 2025-03-26 NOTE — REVIEW OF SYSTEMS
[Diarrhea: Grade 0] : Diarrhea: Grade 0 [Muscle Weakness] : muscle weakness [Skin Rash] : skin rash [Difficulty Walking] : difficulty walking [Negative] : Integumentary [de-identified] : + PN

## 2025-03-26 NOTE — HISTORY OF PRESENT ILLNESS
[Disease: _____________________] : Disease: [unfilled] [AJCC Stage: ____] : AJCC Stage: [unfilled] [Therapy: ___] : Therapy: [unfilled] [de-identified] : 68 M with poor medical follow up, HTN, untreated Hepatitis C presents for management of newly diagnosed cholagiocarcinoma, FGFR2 fusion.   Hong presented to [facility] on 8/10/23 with UTI symptoms, FTT with significant weight loss.  He repors 2-3 mos of progressive weakness, unintentional weight loss of 60 lbs in 2-3 yrs.  CT CAP 8/10/23 showed Large and ill-defined hepatic hypodensity involving a large portion of the right hepatic lobe, retroperitoneal lymphadenopathy. MRI Abd on 8/11/23 with findings are suspicious for multifocal intrahepatic cholangiocarcinoma, with the dominant mass in the right hepatic lobe measuring up to 11.8 cm. Retroperitoneal, periportal and gastric hepatic lymphadenopathy is favored to be metastatic, IR performed liver biopsy 8/14/23 c/w adenocarcinoma, moderately differentiated, IHC stains + CK7,  CK19. Patient found to be + hepatitis C.   PET/CT on 8/22/23  showed FDG-avid dominant lesion in right hepatic lobe and multiple small FDG-avid bilobar hepatic lesions correspond to biopsy-proven malignancy. FDG-avid retroperitoneal and inferior preesophageal lymphadenopathy. FDG-avid focus in upper rectum with questionable corresponding 2 cm soft tissue density on CT, is concerning for neoplasm. Nonspecific, mild hypermetabolism in fundus of stomach may reflect inflammation.   Referred to medical oncology.   9/8/23: Colonoscopy: large rectal polyp resected, benign 9/11/23: C1D1 Gemzar/Cisplatin/Durvalumab  9/18/23: C1D8 10/2/23:C2D1 10/9/23: C2D8 c/b cisplatin rxn with back pain, radiating to chest, hypertensive. Reaction occurred after Cisplatin infusion. Cisplatin d/c'ed 10/19/23: CT CAP: no change in hepatic lesions and RP nodes 10/23/23: C3D1 [place]/Durva alone 10/30/23 C3D8 discussed with pt, concern that Gemzar/Durva alone will not be enough to control disease. Recommendation to add Abraxane. Will d/c Durvalumab as no benefit known with combining with Abraxane.  11/13-1/22/24: C1-3D15 Gemzar/Abraxane 12/28/23: CT C/A/P - stable hepatic lesions and RP LAD 1/29/24: EGD: portal gastropathy 2/5-2/9/14: Admitted to [facility] with melena (2/5 tx held), found to have duodenal variceal bleed s/p obliteration. CT A/P 2/5/24 noted continued liver mass with satellite lesions, difficult to compare to prior scan 2/16/24: US Para: minimal ascites, 50 cc removed c/w portal HTN, neg cytology  3/4/24: resumed Gemzar 800 mg/m2 + Abraxane 100 mg/m2, C4D1 3/18/24: C4D15 4/1/24: C5D1 4/8/24: CT CAP: SD 4/15/24: C5D15 4/29/24: C6D1 5/31/24: CT CAP: incidental RUL segmental PE. Large infiltrative right hepatic lobe mass grossly stable. Multiple satellite nodules in the liver + periportal & retroperitoneal LAD stable. Increasing GB distension w/ increased RUQ & pericholecystic fluid.  5/31/24-6/2/24: Admitted for management of PE & GB distension. Started heparin > eliquis. TTE negative for R heart strain. Per surgery, no surgical intervention, treat w/ ABX  6/3/24: Started Futibatinib 12 mg 6/24/24: Increased Futibatinib 16 mg 7/23/24: futibatinib on hold d/t symptomatic hypotension  7/31/24: resumed futibatinib 12 mg po QD 8/29/24: CT CAP: interval decrease in overall tumor burden with right hepatic lobe, interval decrease in LAD. No new sites of disease 12/2/24: CT CAP : Ill-defined confluent hypodense hepatic lesions within the right hepatic lobe measure smaller compared with the prior exam. A previously visualized 1.0 cm arterially enhancing lesion within segment 3 is not definitively seen. Other scattered small hypodense lesions are not significant changed. Abdominal and retroperitoneal lymphadenopathy is not significantly changed. 3/10/25: CT CAP: Dominant lesion in the right hepatic lobe stable to mildly decreased. New 1 cm subcapsular lesion in segment 7. Small volume upper abdominal/retroperitoneal adenopathy without change [de-identified] : adenocarcinoma, moderately differentiated  [de-identified] : CHIO TMB 1   FGFR2 FGFR2-SORBS1 fusion IDH2 [id number] CBL C416S - subclonal SETD2 [id number]*22 TERT promoter -124C> [de-identified] : Here with his daughter for clinical follow up  Neuropathy still bothersome. Stopped acupuncture. Taking Lyrica qHS  Otherwise, no new complaints

## 2025-03-26 NOTE — PHYSICAL EXAM
[Restricted in physically strenuous activity but ambulatory and able to carry out work of a light or sedentary nature] : Status 1- Restricted in physically strenuous activity but ambulatory and able to carry out work of a light or sedentary nature, e.g., light house work, office work [Normal] : affect appropriate [de-identified] : + dryness, swelling of hands and desquemation involving R plantar aspect of foot , hyperpigmentation of the nails

## 2025-03-26 NOTE — REVIEW OF SYSTEMS
[Diarrhea: Grade 0] : Diarrhea: Grade 0 [Muscle Weakness] : muscle weakness [Skin Rash] : skin rash [Difficulty Walking] : difficulty walking [Negative] : Integumentary [de-identified] : + PN

## 2025-03-26 NOTE — PHYSICAL EXAM
[Restricted in physically strenuous activity but ambulatory and able to carry out work of a light or sedentary nature] : Status 1- Restricted in physically strenuous activity but ambulatory and able to carry out work of a light or sedentary nature, e.g., light house work, office work [Normal] : affect appropriate [de-identified] : + dryness, swelling of hands and desquemation involving R plantar aspect of foot , hyperpigmentation of the nails

## 2025-03-26 NOTE — ASSESSMENT
[Future Reassessment of Pain Scale] : Future reassessment of pain scale    [Palliative] : Goals of care discussed with patient: Palliative [Palliative Care Plan] : not applicable at this time [FreeTextEntry1] : Patient being seen as per physician's primary plan of care.

## 2025-05-06 NOTE — HISTORY OF PRESENT ILLNESS
[Disease: _____________________] : Disease: [unfilled] [AJCC Stage: ____] : AJCC Stage: [unfilled] [Therapy: ___] : Therapy: [unfilled] [de-identified] : 68 M with poor medical follow up, HTN, untreated Hepatitis C presents for management of newly diagnosed cholagiocarcinoma, FGFR2 fusion.   Hong presented to [facility] on 8/10/23 with UTI symptoms, FTT with significant weight loss.  He repors 2-3 mos of progressive weakness, unintentional weight loss of 60 lbs in 2-3 yrs.  CT CAP 8/10/23 showed Large and ill-defined hepatic hypodensity involving a large portion of the right hepatic lobe, retroperitoneal lymphadenopathy. MRI Abd on 8/11/23 with findings are suspicious for multifocal intrahepatic cholangiocarcinoma, with the dominant mass in the right hepatic lobe measuring up to 11.8 cm. Retroperitoneal, periportal and gastric hepatic lymphadenopathy is favored to be metastatic, IR performed liver biopsy 8/14/23 c/w adenocarcinoma, moderately differentiated, IHC stains + CK7,  CK19. Patient found to be + hepatitis C.   PET/CT on 8/22/23  showed FDG-avid dominant lesion in right hepatic lobe and multiple small FDG-avid bilobar hepatic lesions correspond to biopsy-proven malignancy. FDG-avid retroperitoneal and inferior preesophageal lymphadenopathy. FDG-avid focus in upper rectum with questionable corresponding 2 cm soft tissue density on CT, is concerning for neoplasm. Nonspecific, mild hypermetabolism in fundus of stomach may reflect inflammation.   Referred to medical oncology.   9/8/23: Colonoscopy: large rectal polyp resected, benign 9/11/23: C1D1 Gemzar/Cisplatin/Durvalumab  9/18/23: C1D8 10/2/23:C2D1 10/9/23: C2D8 c/b cisplatin rxn with back pain, radiating to chest, hypertensive. Reaction occurred after Cisplatin infusion. Cisplatin d/c'ed 10/19/23: CT CAP: no change in hepatic lesions and RP nodes 10/23/23: C3D1 [place]/Durva alone 10/30/23 C3D8 discussed with pt, concern that Gemzar/Durva alone will not be enough to control disease. Recommendation to add Abraxane. Will d/c Durvalumab as no benefit known with combining with Abraxane.  11/13-1/22/24: C1-3D15 Gemzar/Abraxane 12/28/23: CT C/A/P - stable hepatic lesions and RP LAD 1/29/24: EGD: portal gastropathy 2/5-2/9/14: Admitted to [facility] with melena (2/5 tx held), found to have duodenal variceal bleed s/p obliteration. CT A/P 2/5/24 noted continued liver mass with satellite lesions, difficult to compare to prior scan 2/16/24: US Para: minimal ascites, 50 cc removed c/w portal HTN, neg cytology  3/4/24: resumed Gemzar 800 mg/m2 + Abraxane 100 mg/m2, C4D1 3/18/24: C4D15 4/1/24: C5D1 4/8/24: CT CAP: SD 4/15/24: C5D15 4/29/24: C6D1 5/31/24: CT CAP: incidental RUL segmental PE. Large infiltrative right hepatic lobe mass grossly stable. Multiple satellite nodules in the liver + periportal & retroperitoneal LAD stable. Increasing GB distension w/ increased RUQ & pericholecystic fluid.  5/31/24-6/2/24: Admitted for management of PE & GB distension. Started heparin > eliquis. TTE negative for R heart strain. Per surgery, no surgical intervention, treat w/ ABX  6/3/24: Started Futibatinib 12 mg 6/24/24: Increased Futibatinib 16 mg 7/23/24: futibatinib on hold d/t symptomatic hypotension  7/31/24: resumed futibatinib 12 mg po QD 8/29/24: CT CAP: interval decrease in overall tumor burden with right hepatic lobe, interval decrease in LAD. No new sites of disease 12/2/24: CT CAP : Ill-defined confluent hypodense hepatic lesions within the right hepatic lobe measure smaller compared with the prior exam. A previously visualized 1.0 cm arterially enhancing lesion within segment 3 is not definitively seen. Other scattered small hypodense lesions are not significant changed. Abdominal and retroperitoneal lymphadenopathy is not significantly changed. 3/10/25: CT CAP: Dominant lesion in the right hepatic lobe stable to mildly decreased. New 1 cm subcapsular lesion in segment 7. Small volume upper abdominal/retroperitoneal adenopathy without change [de-identified] : adenocarcinoma, moderately differentiated  [de-identified] : CHIO TMB 1   FGFR2 FGFR2-SORBS1 fusion IDH2 [id number] CBL C416S - subclonal SETD2 [id number]*22 TERT promoter -[id number]>

## 2025-05-06 NOTE — PHYSICAL EXAM
[Restricted in physically strenuous activity but ambulatory and able to carry out work of a light or sedentary nature] : Status 1- Restricted in physically strenuous activity but ambulatory and able to carry out work of a light or sedentary nature, e.g., light house work, office work [Normal] : affect appropriate [de-identified] : + dryness, swelling of hands and desquemation involving R plantar aspect of foot , hyperpigmentation of the nails

## 2025-05-06 NOTE — REVIEW OF SYSTEMS
[Diarrhea: Grade 0] : Diarrhea: Grade 0 [Muscle Weakness] : muscle weakness [Difficulty Walking] : difficulty walking [Negative] : Allergic/Immunologic [de-identified] : + PN

## 2025-05-06 NOTE — REVIEW OF SYSTEMS
[Diarrhea: Grade 0] : Diarrhea: Grade 0 [Muscle Weakness] : muscle weakness [Difficulty Walking] : difficulty walking [Negative] : Allergic/Immunologic [de-identified] : + PN

## 2025-05-06 NOTE — PHYSICAL EXAM
[Restricted in physically strenuous activity but ambulatory and able to carry out work of a light or sedentary nature] : Status 1- Restricted in physically strenuous activity but ambulatory and able to carry out work of a light or sedentary nature, e.g., light house work, office work [Normal] : affect appropriate [de-identified] : + dryness, swelling of hands and desquemation involving R plantar aspect of foot , hyperpigmentation of the nails

## 2025-05-06 NOTE — HISTORY OF PRESENT ILLNESS
[Disease: _____________________] : Disease: [unfilled] [AJCC Stage: ____] : AJCC Stage: [unfilled] [Therapy: ___] : Therapy: [unfilled] [de-identified] : 68 M with poor medical follow up, HTN, untreated Hepatitis C presents for management of newly diagnosed cholagiocarcinoma, FGFR2 fusion.   Hong presented to [facility] on 8/10/23 with UTI symptoms, FTT with significant weight loss.  He repors 2-3 mos of progressive weakness, unintentional weight loss of 60 lbs in 2-3 yrs.  CT CAP 8/10/23 showed Large and ill-defined hepatic hypodensity involving a large portion of the right hepatic lobe, retroperitoneal lymphadenopathy. MRI Abd on 8/11/23 with findings are suspicious for multifocal intrahepatic cholangiocarcinoma, with the dominant mass in the right hepatic lobe measuring up to 11.8 cm. Retroperitoneal, periportal and gastric hepatic lymphadenopathy is favored to be metastatic, IR performed liver biopsy 8/14/23 c/w adenocarcinoma, moderately differentiated, IHC stains + CK7,  CK19. Patient found to be + hepatitis C.   PET/CT on 8/22/23  showed FDG-avid dominant lesion in right hepatic lobe and multiple small FDG-avid bilobar hepatic lesions correspond to biopsy-proven malignancy. FDG-avid retroperitoneal and inferior preesophageal lymphadenopathy. FDG-avid focus in upper rectum with questionable corresponding 2 cm soft tissue density on CT, is concerning for neoplasm. Nonspecific, mild hypermetabolism in fundus of stomach may reflect inflammation.   Referred to medical oncology.   9/8/23: Colonoscopy: large rectal polyp resected, benign 9/11/23: C1D1 [place]zar/Cisplatin/Durvalumab  9/18/23: C1D8 10/2/23:C2D1 10/9/23: C2D8 c/b cisplatin rxn with back pain, radiating to chest, hypertensive. Reaction occurred after Cisplatin infusion. Cisplatin d/c'ed 10/19/23: CT CAP: no change in hepatic lesions and RP nodes 10/23/23: C3D1 [place]/Durva alone 10/30/23 C3D8 discussed with pt, concern that Gemzar/Durva alone will not be enough to control disease. Recommendation to add Abraxane. Will d/c Durvalumab as no benefit known with combining with Abraxane.  11/13-1/22/24: C1-3D15 Gemzar/Abraxane 12/28/23: CT C/A/P - stable hepatic lesions and RP LAD 1/29/24: EGD: portal gastropathy 2/5-2/9/14: Admitted to [facility] with melena (2/5 tx held), found to have duodenal variceal bleed s/p obliteration. CT A/P 2/5/24 noted continued liver mass with satellite lesions, difficult to compare to prior scan 2/16/24: US Para: minimal ascites, 50 cc removed c/w portal HTN, neg cytology  3/4/24: resumed Gemzar 800 mg/m2 + Abraxane 100 mg/m2, C4D1 3/18/24: C4D15 4/1/24: C5D1 4/8/24: CT CAP: SD 4/15/24: C5D15 4/29/24: C6D1 5/31/24: CT CAP: incidental RUL segmental PE. Large infiltrative right hepatic lobe mass grossly stable. Multiple satellite nodules in the liver + periportal & retroperitoneal LAD stable. Increasing GB distension w/ increased RUQ & pericholecystic fluid.  5/31/24-6/2/24: Admitted for management of PE & GB distension. Started heparin > eliquis. TTE negative for R heart strain. Per surgery, no surgical intervention, treat w/ ABX  6/3/24: Started Futibatinib 12 mg 6/24/24: Increased Futibatinib 16 mg 7/23/24: futibatinib on hold d/t symptomatic hypotension  7/31/24: resumed futibatinib 12 mg po QD 8/29/24: CT CAP: interval decrease in overall tumor burden with right hepatic lobe, interval decrease in LAD. No new sites of disease 12/2/24: CT CAP : Ill-defined confluent hypodense hepatic lesions within the right hepatic lobe measure smaller compared with the prior exam. A previously visualized 1.0 cm arterially enhancing lesion within segment 3 is not definitively seen. Other scattered small hypodense lesions are not significant changed. Abdominal and retroperitoneal lymphadenopathy is not significantly changed. 3/10/25: CT CAP: Dominant lesion in the right hepatic lobe stable to mildly decreased. New 1 cm subcapsular lesion in segment 7. Small volume upper abdominal/retroperitoneal adenopathy without change [de-identified] : adenocarcinoma, moderately differentiated  [de-identified] : CHIO TMB 1   FGFR2 FGFR2-SORBS1 fusion IDH2 [id number] CBL C416S - subclonal SETD2 [id number]*22 TERT promoter -[id number]>

## 2025-05-20 NOTE — PROCEDURE
[Injection] : Injection [Left] : of the left [GH Joint] : glenohumeral joint [Joint Pain] : joint pain [Osteoarthritis] : osteoarthritis [Bleeding] : bleeding [Infection] : infection [Allergic Reaction] : allergic reaction [Alcohol] : alcohol [Betadine] : betadine [Ethyl Chloride Spray] : ethyl chloride spray was used as a topical anesthetic [Ultrasound Guided] : The procedure was ultrasound guided.   [Posterior] : posterior [25] : a 25-gauge [1% Lidocaine___(mL)] : [unfilled] mL of 1% Lidocaine [Methylpred. 40mg/mL___(mL)] : [unfilled] mL 40mg/mL methylprednisolone [Tolerated Well] : The patient tolerated the procedure well [None] : none [PRN] : PRN

## 2025-05-20 NOTE — DISCUSSION/SUMMARY
[PRN] : PRN [de-identified] : Fellow Domenic:L shoulder GHJ OA with reoccurring symptoms now s/p repeat GHJ CSI today. had improvement in symptoms. He can return in 3 months for repeat GHJ CSI if needed. pt in agreement with plan Attending note.  Agree with above.  Impression: #1 right shoulder pain, #2 severe osteoarthritis right shoulder.  Patient is currently on break from chemotherapy.  He last had a CSI injection into the right glenohumeral joint on February 11.  Patient states started to have recurrence of pain last month.  Patient reported great relief after the first injection.  Patient received CSI today with 1 cc of 40 mg Depo-Medrol +3 cc of 5% ropivacaine +1 cc of 1% lidocaine.  Patient reports significant improvement immediately after injection.  He will return to the office no sooner than mid August for CSI if needed.

## 2025-05-20 NOTE — PHYSICAL EXAM
[de-identified] : Fellow Domenic:  Patient had markedly diminished active range of motion of the right shoulder secondary to pain with forward flexion approximately 45 degrees, abduction approximately 45 degrees and limited external rotation to approximately 15 to 20 degrees.  There are no skin lesions or rashes.

## 2025-05-20 NOTE — HISTORY OF PRESENT ILLNESS
[de-identified] : Fellow Domenic: 69M hx significant L shoulder OA  last CSI Feb 11/2025 here for recurrent L shoulder pain. states last injection helped significantly but symptoms now worsening again. he is interested in a repeat injection to the left shoulder.

## 2025-05-21 NOTE — PHYSICAL EXAM
[Restricted in physically strenuous activity but ambulatory and able to carry out work of a light or sedentary nature] : Status 1- Restricted in physically strenuous activity but ambulatory and able to carry out work of a light or sedentary nature, e.g., light house work, office work [Normal] : affect appropriate [de-identified] : + dryness, swelling of hands and desquemation involving R plantar aspect of foot , hyperpigmentation of the nails

## 2025-05-21 NOTE — PHYSICAL EXAM
[Restricted in physically strenuous activity but ambulatory and able to carry out work of a light or sedentary nature] : Status 1- Restricted in physically strenuous activity but ambulatory and able to carry out work of a light or sedentary nature, e.g., light house work, office work [Normal] : affect appropriate [de-identified] : + dryness, swelling of hands and desquemation involving R plantar aspect of foot , hyperpigmentation of the nails

## 2025-05-21 NOTE — REASON FOR VISIT
[Follow-Up Visit] : a follow-up [Family Member] : family member [Home] : at home, [unfilled] , at the time of the visit. [Medical Office: ([facility])___] : at the medical office located in  [Telephone (audio)] : This telephonic visit was provided via audio only technology. [Technical] : patient unable to effectively utilize tele-video due to technical issues. [Verbal consent obtained from patient] : the patient, [unfilled] [FreeTextEntry2] : Stage IV cholangiocarcinoma, FGFR fusion

## 2025-05-21 NOTE — REASON FOR VISIT
[Follow-Up Visit] : a follow-up [Family Member] : family member [Home] : at home, [unfilled] , at the time of the visit. [Medical Office: ([facility])___] : at the medical office located in  [Telephone (audio)] : This telephonic visit was provided via audio only technology. [Technical] : patient unable to effectively utilize tele-video due to technical issues. [Verbal consent obtained from patient] : the patient, [unfilled] [FreeTextEntry2] : Stage IV cholangiocarcinoma, FGFR fusion

## 2025-05-21 NOTE — HISTORY OF PRESENT ILLNESS
[Disease: _____________________] : Disease: [unfilled] [AJCC Stage: ____] : AJCC Stage: [unfilled] [Therapy: ___] : Therapy: [unfilled] [de-identified] : 68 M with poor medical follow up, HTN, untreated Hepatitis C presents for management of newly diagnosed cholagiocarcinoma, FGFR2 fusion.   Hong presented to [facility] on 8/10/23 with UTI symptoms, FTT with significant weight loss.  He repors 2-3 mos of progressive weakness, unintentional weight loss of 60 lbs in 2-3 yrs.  CT CAP 8/10/23 showed Large and ill-defined hepatic hypodensity involving a large portion of the right hepatic lobe, retroperitoneal lymphadenopathy. MRI Abd on 8/11/23 with findings are suspicious for multifocal intrahepatic cholangiocarcinoma, with the dominant mass in the right hepatic lobe measuring up to 11.8 cm. Retroperitoneal, periportal and gastric hepatic lymphadenopathy is favored to be metastatic, IR performed liver biopsy 8/14/23 c/w adenocarcinoma, moderately differentiated, IHC stains + CK7,  CK19. Patient found to be + hepatitis C.   PET/CT on 8/22/23  showed FDG-avid dominant lesion in right hepatic lobe and multiple small FDG-avid bilobar hepatic lesions correspond to biopsy-proven malignancy. FDG-avid retroperitoneal and inferior preesophageal lymphadenopathy. FDG-avid focus in upper rectum with questionable corresponding 2 cm soft tissue density on CT, is concerning for neoplasm. Nonspecific, mild hypermetabolism in fundus of stomach may reflect inflammation.   Referred to medical oncology.   9/8/23: Colonoscopy: large rectal polyp resected, benign 9/11/23: C1D1 Gemzar/Cisplatin/Durvalumab  9/18/23: C1D8 10/2/23:C2D1 10/9/23: C2D8 c/b cisplatin rxn with back pain, radiating to chest, hypertensive. Reaction occurred after Cisplatin infusion. Cisplatin d/c'ed 10/19/23: CT CAP: no change in hepatic lesions and RP nodes 10/23/23: C3D1 [place]/Durva alone 10/30/23 C3D8 discussed with pt, concern that Gemzar/Durva alone will not be enough to control disease. Recommendation to add Abraxane. Will d/c Durvalumab as no benefit known with combining with Abraxane.  11/13-1/22/24: C1-3D15 Gemzar/Abraxane 12/28/23: CT C/A/P - stable hepatic lesions and RP LAD 1/29/24: EGD: portal gastropathy 2/5-2/9/14: Admitted to [facility] with melena (2/5 tx held), found to have duodenal variceal bleed s/p obliteration. CT A/P 2/5/24 noted continued liver mass with satellite lesions, difficult to compare to prior scan 2/16/24: US Para: minimal ascites, 50 cc removed c/w portal HTN, neg cytology  3/4/24: resumed Gemzar 800 mg/m2 + Abraxane 100 mg/m2, C4D1 3/18/24: C4D15 4/1/24: C5D1 4/8/24: CT CAP: SD 4/15/24: C5D15 4/29/24: C6D1 5/31/24: CT CAP: incidental RUL segmental PE. Large infiltrative right hepatic lobe mass grossly stable. Multiple satellite nodules in the liver + periportal & retroperitoneal LAD stable. Increasing GB distension w/ increased RUQ & pericholecystic fluid.  5/31/24-6/2/24: Admitted for management of PE & GB distension. Started heparin > eliquis. TTE negative for R heart strain. Per surgery, no surgical intervention, treat w/ ABX  6/3/24: Started Futibatinib 12 mg 6/24/24: Increased Futibatinib 16 mg 7/23/24: futibatinib on hold d/t symptomatic hypotension  7/31/24: resumed futibatinib 12 mg po QD 8/29/24: CT CAP: interval decrease in overall tumor burden with right hepatic lobe, interval decrease in LAD. No new sites of disease 12/2/24: CT CAP : Ill-defined confluent hypodense hepatic lesions within the right hepatic lobe measure smaller compared with the prior exam. A previously visualized 1.0 cm arterially enhancing lesion within segment 3 is not definitively seen. Other scattered small hypodense lesions are not significant changed. Abdominal and retroperitoneal lymphadenopathy is not significantly changed. 3/10/25: CT CAP: Dominant lesion in the right hepatic lobe stable to mildly decreased. New 1 cm subcapsular lesion in segment 7. Small volume upper abdominal/retroperitoneal adenopathy without change 5/14/25: CT CAP: Stable dominant lesion within the right hepatic lobe. Slight increase in segment 7 subcapsular lesion, now measuring up to 1.4 cm. Slight interval increase in adenopathy. [de-identified] : adenocarcinoma, moderately differentiated  [de-identified] : CHIO TMB 1   FGFR2 FGFR2-SORBS1 fusion IDH2 R172K CBL C416S - subclonal SETD2 [id number]*22 TERT promoter -124C> [de-identified] : since being off Futibatnib has noticed some mild improvement in HFS, mostly feet. can stand on his feet all day without limitations.

## 2025-05-21 NOTE — REVIEW OF SYSTEMS
[Diarrhea: Grade 0] : Diarrhea: Grade 0 [Muscle Weakness] : muscle weakness [Difficulty Walking] : difficulty walking [Negative] : Allergic/Immunologic [de-identified] : + PN

## 2025-05-21 NOTE — HISTORY OF PRESENT ILLNESS
[Disease: _____________________] : Disease: [unfilled] [AJCC Stage: ____] : AJCC Stage: [unfilled] [Therapy: ___] : Therapy: [unfilled] [de-identified] : 68 M with poor medical follow up, HTN, untreated Hepatitis C presents for management of newly diagnosed cholagiocarcinoma, FGFR2 fusion.   Hong presented to [facility] on 8/10/23 with UTI symptoms, FTT with significant weight loss.  He repors 2-3 mos of progressive weakness, unintentional weight loss of 60 lbs in 2-3 yrs.  CT CAP 8/10/23 showed Large and ill-defined hepatic hypodensity involving a large portion of the right hepatic lobe, retroperitoneal lymphadenopathy. MRI Abd on 8/11/23 with findings are suspicious for multifocal intrahepatic cholangiocarcinoma, with the dominant mass in the right hepatic lobe measuring up to 11.8 cm. Retroperitoneal, periportal and gastric hepatic lymphadenopathy is favored to be metastatic, IR performed liver biopsy 8/14/23 c/w adenocarcinoma, moderately differentiated, IHC stains + CK7,  CK19. Patient found to be + hepatitis C.   PET/CT on 8/22/23  showed FDG-avid dominant lesion in right hepatic lobe and multiple small FDG-avid bilobar hepatic lesions correspond to biopsy-proven malignancy. FDG-avid retroperitoneal and inferior preesophageal lymphadenopathy. FDG-avid focus in upper rectum with questionable corresponding 2 cm soft tissue density on CT, is concerning for neoplasm. Nonspecific, mild hypermetabolism in fundus of stomach may reflect inflammation.   Referred to medical oncology.   9/8/23: Colonoscopy: large rectal polyp resected, benign 9/11/23: C1D1 Gemzar/Cisplatin/Durvalumab  9/18/23: C1D8 10/2/23:C2D1 10/9/23: C2D8 c/b cisplatin rxn with back pain, radiating to chest, hypertensive. Reaction occurred after Cisplatin infusion. Cisplatin d/c'ed 10/19/23: CT CAP: no change in hepatic lesions and RP nodes 10/23/23: C3D1 [place]/Durva alone 10/30/23 C3D8 discussed with pt, concern that Gemzar/Durva alone will not be enough to control disease. Recommendation to add Abraxane. Will d/c Durvalumab as no benefit known with combining with Abraxane.  11/13-1/22/24: C1-3D15 Gemzar/Abraxane 12/28/23: CT C/A/P - stable hepatic lesions and RP LAD 1/29/24: EGD: portal gastropathy 2/5-2/9/14: Admitted to [facility] with melena (2/5 tx held), found to have duodenal variceal bleed s/p obliteration. CT A/P 2/5/24 noted continued liver mass with satellite lesions, difficult to compare to prior scan 2/16/24: US Para: minimal ascites, 50 cc removed c/w portal HTN, neg cytology  3/4/24: resumed Gemzar 800 mg/m2 + Abraxane 100 mg/m2, C4D1 3/18/24: C4D15 4/1/24: C5D1 4/8/24: CT CAP: SD 4/15/24: C5D15 4/29/24: C6D1 5/31/24: CT CAP: incidental RUL segmental PE. Large infiltrative right hepatic lobe mass grossly stable. Multiple satellite nodules in the liver + periportal & retroperitoneal LAD stable. Increasing GB distension w/ increased RUQ & pericholecystic fluid.  5/31/24-6/2/24: Admitted for management of PE & GB distension. Started heparin > eliquis. TTE negative for R heart strain. Per surgery, no surgical intervention, treat w/ ABX  6/3/24: Started Futibatinib 12 mg 6/24/24: Increased Futibatinib 16 mg 7/23/24: futibatinib on hold d/t symptomatic hypotension  7/31/24: resumed futibatinib 12 mg po QD 8/29/24: CT CAP: interval decrease in overall tumor burden with right hepatic lobe, interval decrease in LAD. No new sites of disease 12/2/24: CT CAP : Ill-defined confluent hypodense hepatic lesions within the right hepatic lobe measure smaller compared with the prior exam. A previously visualized 1.0 cm arterially enhancing lesion within segment 3 is not definitively seen. Other scattered small hypodense lesions are not significant changed. Abdominal and retroperitoneal lymphadenopathy is not significantly changed. 3/10/25: CT CAP: Dominant lesion in the right hepatic lobe stable to mildly decreased. New 1 cm subcapsular lesion in segment 7. Small volume upper abdominal/retroperitoneal adenopathy without change 5/14/25: CT CAP: Stable dominant lesion within the right hepatic lobe. Slight increase in segment 7 subcapsular lesion, now measuring up to 1.4 cm. Slight interval increase in adenopathy. [de-identified] : adenocarcinoma, moderately differentiated  [de-identified] : CHIO TMB 1   FGFR2 FGFR2-SORBS1 fusion IDH2 R172K CBL C416S - subclonal SETD2 [id number]*22 TERT promoter -124C> [de-identified] : since being off Futibatnib has noticed some mild improvement in HFS, mostly feet. can stand on his feet all day without limitations.

## 2025-05-21 NOTE — REVIEW OF SYSTEMS
[Diarrhea: Grade 0] : Diarrhea: Grade 0 [Muscle Weakness] : muscle weakness [Difficulty Walking] : difficulty walking [Negative] : Allergic/Immunologic [de-identified] : + PN

## 2025-05-30 NOTE — REASON FOR VISIT
[Consultation] : a consultation visit [Home] : at home, [unfilled] , at the time of the visit. [Medical Office: ([facility])___] : at the medical office located in  [Telehealth (audio & video)] : This visit was provided via telehealth using real-time 2-way audio visual technology. [Verbal consent obtained from patient] : the patient, [unfilled]

## 2025-05-30 NOTE — ASSESSMENT
[FreeTextEntry1] : Mr. Kearns is a 69 y.o male with a PMH of  HTN, untreated Hepatitis C, newly diagnosed cholangiocarcinoma who presents to IR for consultation for systemic therapy.   1. Cholangiocarcinoma - pt with hx of Cholangiocracinoma on systemic therapy since 2023, currently on futibatinib 12 mg po QD (currently holding?) with liver lesions - referred by Dr. Kiser for radioembolization - 5/14/25 CT demonstrates: > Stable dominant lesion within the right hepatic lobe. > Slight increase in segment 7 subcapsular lesion, now measuring up to 1.4 cm. - Imaging was reviewed at great length with Mr. KEARNS & family  - recommending SIRT as tx modality - a two staged outpatient procedure - mapping angiogram with NM intrahepatic study followed by Y90 SIRT 1-2 wks later pending shunt study results - procedural risks, benefits, alternatives  & risks were discussed, as well, inclusive of bleeding, infection, EVERT, worsening liver function, & expected postprocedural course - may experience post embolization syndrome - low grade fever (<101 F), flu-like s/s, body aches / fatigue, appetite loss which may last up to 1 week - reviewed radiation safety measures - 5/14/25 CT chest:  No pulmonary nodules - Bone scan as per Dr. Kiser - Nayeli Thayer - Meld:   - will make arrangements - continue with consistent f/u with Dr. Kiser      I have provided the patient the opportunity to ask questions and have answered them to their satisfaction.  They are encouraged to contact our office with any further questions, concerns, or issues. [SIRT Procedure & Planning] : SIRT procedure and planning discussed at length

## 2025-05-30 NOTE — HISTORY OF PRESENT ILLNESS
[FreeTextEntry1] : Mr. Gonzalez is a 69 y.o male with a PMH of  HTN, untreated Hepatitis C, newly diagnosed cholangiocarcinoma who presents to IR for consultation for systemic therapy.   Hong presented to [facility] on 8/10/23 with UTI symptoms, FTT with significant weight loss. He reports 2-3 mos of progressive weakness, unintentional weight loss of 60 lbs in 2-3 yrs. Imaging revealed multifocal with the dominant mass in the right hepatic lobe measuring up to 11.8 cm. Liver bx completed 8/14/23 c/w adenocarcinoma, moderately differentiated. Has been on systemic therapy since 2023. Currently on futibatinib 12 mg po QD (currently holding?). Referred by Dr. Kiser for LDT.   Today he reports............  Hemeonc: Hilda Kiser

## 2025-06-04 NOTE — HISTORY OF PRESENT ILLNESS
[0] : ~His/Her~ pain was 0 out of 10 [FreeTextEntry1] : Daughter Edwige present on consult  Mr. Gonzalez is a 69 y.o male with a PMH of HTN (now resolved), Hepatitis C, newly diagnosed cholangiocarcinoma who presents to IR for consultation for systemic therapy.   Hong presented to [facility] on 8/10/23 with UTI symptoms, FTT with significant weight loss. He reports 2-3 mos of progressive weakness, unintentional weight loss of 60 lbs in 2-3 yrs. Imaging revealed multifocal with the dominant mass in the right hepatic lobe measuring up to 11.8 cm. Liver bx completed 8/14/23 c/w adenocarcinoma, moderately differentiated. Has been on systemic therapy since 2023. Previously on IV systemic therapy, currently on futibatinib 12 mg po QD (currently holding). Referred by Dr. Kiser for LDT.   Today he reports feeling overall well. He is very active, works as an . Last year (2024) had episode of decompensation after reported blood loss after an endoscopy with bx. Reports has had a few paracenteses last year (No paracentesis over the last 6+ months). No longer on diuretics. Also had previous HE was on lactulose, has not had any further HE since 2024. He had previous significant weight loss now up from 130 to 160 lbs. Eating well and has good energy level. Denies abdominal pain, discoloration of the skin/eyes, abdominal distention, LE edema, confusion, hematochezia, melena, or n/v/d.   Lives with spouse at home however spouse currently in rehab after infected knee replacement. Has several daughters who live nearby and very involved in care.   Hemeonc: Hilda Kiser

## 2025-06-04 NOTE — ASSESSMENT
[FreeTextEntry1] : Mr. Kearns is a 69 y.o male with a PMH of  HTN, untreated Hepatitis C, newly diagnosed cholangiocarcinoma who presents to IR for consultation for systemic therapy.   1. Cholangiocarcinoma - pt with hx of Cholangiocracinoma on systemic therapy since 2023, currently on futibatinib 12 mg po QD (currently holding over the last month) with liver lesions - referred by Dr. Kiser for radioembolization - 5/14/25 CT demonstrates: > Stable dominant lesion within the right hepatic lobe. > Slight increase in segment 7 subcapsular lesion, now measuring up to 1.4 cm. - Imaging was reviewed at great length with Mr. KEARNS & family - recommending SIRT as tx modality - a two staged outpatient procedure - mapping angiogram with NM intrahepatic study followed by Y90 SIRT 1-2 wks later pending shunt study results - procedural risks, benefits, alternatives  & risks were discussed, as well, inclusive of bleeding, infection, EVERT, worsening liver function, & expected postprocedural course - may experience post embolization syndrome - low grade fever (<101 F), flu-like s/s, body aches / fatigue, appetite loss which may last up to 1 week - reviewed radiation safety measures - plan to perform SIRT to seg 7 lesion, may assess dominant lesion on mapping and discuss SIRT to dominant lesion after with Dr. Kiser-however there is concern due to prior decompensation although stable liver function now - 5/14/25 CT chest:  No pulmonary nodules - Bone scan as per Dr. Kiser - Nayeli Thayer: A5 (5/6 labs, 3/5 INR) - Meld: 12  (5/6 labs, 3/5 INR) - will make arrangements - CT 2 mos post op (pt is claustrophobic would prefer not to have MR) - daughters will assist with driving/staying overnight - continue with consistent f/u with Dr. Kiser  2. PE - hx of PE in 2024 - managed by Dr. Kiser - hx of smoking reports occasional cough otherwise no other respiratory s/s - would like to hold eliquis 48 hrs preop to mitigate risk of bleeding- will discuss with Dr. Kiser  3. Blood/ Blood Products - denies any objections to blood or blood products should they be needed DOS   I have provided the patient the opportunity to ask questions and have answered them to their satisfaction.  They are encouraged to contact our office with any further questions, concerns, or issues.

## 2025-06-04 NOTE — HISTORY OF PRESENT ILLNESS
[Disease: _____________________] : Disease: [unfilled] [AJCC Stage: ____] : AJCC Stage: [unfilled] [Therapy: ___] : Therapy: [unfilled] [de-identified] : 68 M with poor medical follow up, HTN, untreated Hepatitis C presents for management of newly diagnosed cholagiocarcinoma, FGFR2 fusion.   Hong presented to [facility] on 8/10/23 with UTI symptoms, FTT with significant weight loss.  He repors 2-3 mos of progressive weakness, unintentional weight loss of 60 lbs in 2-3 yrs.  CT CAP 8/10/23 showed Large and ill-defined hepatic hypodensity involving a large portion of the right hepatic lobe, retroperitoneal lymphadenopathy. MRI Abd on 8/11/23 with findings are suspicious for multifocal intrahepatic cholangiocarcinoma, with the dominant mass in the right hepatic lobe measuring up to 11.8 cm. Retroperitoneal, periportal and gastric hepatic lymphadenopathy is favored to be metastatic, IR performed liver biopsy 8/14/23 c/w adenocarcinoma, moderately differentiated, IHC stains + CK7,  CK19. Patient found to be + hepatitis C.   PET/CT on 8/22/23  showed FDG-avid dominant lesion in right hepatic lobe and multiple small FDG-avid bilobar hepatic lesions correspond to biopsy-proven malignancy. FDG-avid retroperitoneal and inferior preesophageal lymphadenopathy. FDG-avid focus in upper rectum with questionable corresponding 2 cm soft tissue density on CT, is concerning for neoplasm. Nonspecific, mild hypermetabolism in fundus of stomach may reflect inflammation.   Referred to medical oncology.   9/8/23: Colonoscopy: large rectal polyp resected, benign 9/11/23: C1D1 Gemzar/Cisplatin/Durvalumab  9/18/23: C1D8 10/2/23:C2D1 10/9/23: C2D8 c/b cisplatin rxn with back pain, radiating to chest, hypertensive. Reaction occurred after Cisplatin infusion. Cisplatin d/c'ed 10/19/23: CT CAP: no change in hepatic lesions and RP nodes 10/23/23: C3D1 [place]/Durva alone 10/30/23 C3D8 discussed with pt, concern that Gemzar/Durva alone will not be enough to control disease. Recommendation to add Abraxane. Will d/c Durvalumab as no benefit known with combining with Abraxane.  11/13-1/22/24: C1-3D15 Gemzar/Abraxane 12/28/23: CT C/A/P - stable hepatic lesions and RP LAD 1/29/24: EGD: portal gastropathy 2/5-2/9/14: Admitted to [facility] with melena (2/5 tx held), found to have duodenal variceal bleed s/p obliteration. CT A/P 2/5/24 noted continued liver mass with satellite lesions, difficult to compare to prior scan 2/16/24: US Para: minimal ascites, 50 cc removed c/w portal HTN, neg cytology  3/4/24: resumed Gemzar 800 mg/m2 + Abraxane 100 mg/m2, C4D1 3/18/24: C4D15 4/1/24: C5D1 4/8/24: CT CAP: SD 4/15/24: C5D15 4/29/24: C6D1 5/31/24: CT CAP: incidental RUL segmental PE. Large infiltrative right hepatic lobe mass grossly stable. Multiple satellite nodules in the liver + periportal & retroperitoneal LAD stable. Increasing GB distension w/ increased RUQ & pericholecystic fluid.  5/31/24-6/2/24: Admitted for management of PE & GB distension. Started heparin > eliquis. TTE negative for R heart strain. Per surgery, no surgical intervention, treat w/ ABX  6/3/24: Started Futibatinib 12 mg 6/24/24: Increased Futibatinib 16 mg 7/23/24: futibatinib on hold d/t symptomatic hypotension  7/31/24: resumed futibatinib 12 mg po QD 8/29/24: CT CAP: interval decrease in overall tumor burden with right hepatic lobe, interval decrease in LAD. No new sites of disease 12/2/24: CT CAP : Ill-defined confluent hypodense hepatic lesions within the right hepatic lobe measure smaller compared with the prior exam. A previously visualized 1.0 cm arterially enhancing lesion within segment 3 is not definitively seen. Other scattered small hypodense lesions are not significant changed. Abdominal and retroperitoneal lymphadenopathy is not significantly changed. 3/10/25: CT CAP: Dominant lesion in the right hepatic lobe stable to mildly decreased. New 1 cm subcapsular lesion in segment 7. Small volume upper abdominal/retroperitoneal adenopathy without change 5/14/25: CT CAP: Stable dominant lesion within the right hepatic lobe. Slight increase in segment 7 subcapsular lesion, now measuring up to 1.4 cm. Slight interval increase in adenopathy. [de-identified] : adenocarcinoma, moderately differentiated  [de-identified] : CHIO TMB 1   FGFR2 FGFR2-SORBS1 fusion IDH2 R172K CBL C416S - subclonal SETD2 [id number] TERT promoter -124C> [FreeTextEntry1] : oh hold  [de-identified] : weight is stable, appetite is fair  HFS improved but neuropathy is still quite bothersome.  difficulty with balance  had consultation with IR today re Y90 to the smaller lesion, plan for procedure in the next few weeks

## 2025-06-04 NOTE — HISTORY OF PRESENT ILLNESS
[0] : ~His/Her~ pain was 0 out of 10 [FreeTextEntry1] : Daughter Edwige present on consult  Mr. Gonzalez is a 69 y.o male with a PMH of HTN (now resolved), Hepatitis C, newly diagnosed cholangiocarcinoma who presents to IR for consultation for systemic therapy.   Hong presented to [facility] on 8/10/23 with UTI symptoms, FTT with significant weight loss. He reports 2-3 mos of progressive weakness, unintentional weight loss of 60 lbs in 2-3 yrs. Imaging revealed multifocal with the dominant mass in the right hepatic lobe measuring up to 11.8 cm. Liver bx completed 8/14/23 c/w adenocarcinoma, moderately differentiated. Has been on systemic therapy since 2023. Previously on IV systemic therapy, currently on futibatinib 12 mg po QD (currently holding). Referred by Dr. Kiser for LDT.   Today he reports feeling overall well. He is very active, works as an . Last year (2024) had episode of decompensation after reported blood loss after an endoscopy with bx. Reports has had a few paracenteses last year (No paracentesis over the last 6+ months). No longer on diuretics. Also had previous HE was on lactulose, has not had any further HE since 2024. He had previous significant weight loss now up from 130 to 160 lbs. Eating well and has good energy level. Denies abdominal pain, discoloration of the skin/eyes, abdominal distention, LE edema, confusion, hematochezia, melena, or n/v/d.   Lives with spouse at home however spouse currently in rehab after infected knee replacement. Has several daughters who live nearby and very involved in care.   Hemeonc: Hilda Kiser

## 2025-06-04 NOTE — PHYSICAL EXAM
Ambulatory
[Fully active, able to carry on all pre-disease performance without restriction] : Fully active, able to carry on all pre-disease performance without restriction

## 2025-06-04 NOTE — HISTORY OF PRESENT ILLNESS
[0] : ~His/Her~ pain was 0 out of 10 [FreeTextEntry1] : Daughter Edwige present on consult  Mr. Gonzalez is a 69 y.o male with a PMH of HTN (now resolved), Hepatitis C, newly diagnosed cholangiocarcinoma who presents to IR for consultation for systemic therapy.   Hong presented to [facility] on 8/10/23 with UTI symptoms, FTT with significant weight loss. He reports 2-3 mos of progressive weakness, unintentional weight loss of 60 lbs in 2-3 yrs. Imaging revealed multifocal with the dominant mass in the right hepatic lobe measuring up to 11.8 cm. Liver bx completed 8/14/23 c/w adenocarcinoma, moderately differentiated. Has been on systemic therapy since 2023. Previously on IV systemic therapy, currently on futibatinib 12 mg po QD (currently holding). Referred by Dr. Kiser for LDT.   Today he reports feeling overall well. He is very active, works as an . Last year (2024) had episode of decompensation after reported blood loss after an endoscopy with bx. Reports has had a few paracenteses last year (No paracentesis over the last 6+ months). No longer on diuretics. Also had previous HE was on lactulose, has not had any further HE since 2024. He had previous significant weight loss now up from 130 to 160 lbs. Eating well and has good energy level. Denies abdominal pain, discoloration of the skin/eyes, abdominal distention, LE edema, confusion, hematochezia, melena, or n/v/d.   Lives with spouse at home however spouse currently in rehab after infected knee replacement. Has several daughters who live nearby and very involved in care.   Hemeonc: Hilda Kiser

## 2025-06-04 NOTE — REVIEW OF SYSTEMS
[Diarrhea: Grade 0] : Diarrhea: Grade 0 [Muscle Weakness] : muscle weakness [Difficulty Walking] : difficulty walking [Negative] : Allergic/Immunologic [Skin Rash] : skin rash [de-identified] : + PN

## 2025-06-04 NOTE — PHYSICAL EXAM
[Restricted in physically strenuous activity but ambulatory and able to carry out work of a light or sedentary nature] : Status 1- Restricted in physically strenuous activity but ambulatory and able to carry out work of a light or sedentary nature, e.g., light house work, office work [Normal] : affect appropriate [de-identified] : dryness of hands and feet

## 2025-06-04 NOTE — HISTORY OF PRESENT ILLNESS
[Disease: _____________________] : Disease: [unfilled] [AJCC Stage: ____] : AJCC Stage: [unfilled] [Therapy: ___] : Therapy: [unfilled] [de-identified] : 68 M with poor medical follow up, HTN, untreated Hepatitis C presents for management of newly diagnosed cholagiocarcinoma, FGFR2 fusion.   Hong presented to [facility] on 8/10/23 with UTI symptoms, FTT with significant weight loss.  He repors 2-3 mos of progressive weakness, unintentional weight loss of 60 lbs in 2-3 yrs.  CT CAP 8/10/23 showed Large and ill-defined hepatic hypodensity involving a large portion of the right hepatic lobe, retroperitoneal lymphadenopathy. MRI Abd on 8/11/23 with findings are suspicious for multifocal intrahepatic cholangiocarcinoma, with the dominant mass in the right hepatic lobe measuring up to 11.8 cm. Retroperitoneal, periportal and gastric hepatic lymphadenopathy is favored to be metastatic, IR performed liver biopsy 8/14/23 c/w adenocarcinoma, moderately differentiated, IHC stains + CK7,  CK19. Patient found to be + hepatitis C.   PET/CT on 8/22/23  showed FDG-avid dominant lesion in right hepatic lobe and multiple small FDG-avid bilobar hepatic lesions correspond to biopsy-proven malignancy. FDG-avid retroperitoneal and inferior preesophageal lymphadenopathy. FDG-avid focus in upper rectum with questionable corresponding 2 cm soft tissue density on CT, is concerning for neoplasm. Nonspecific, mild hypermetabolism in fundus of stomach may reflect inflammation.   Referred to medical oncology.   9/8/23: Colonoscopy: large rectal polyp resected, benign 9/11/23: C1D1 Gemzar/Cisplatin/Durvalumab  9/18/23: C1D8 10/2/23:C2D1 10/9/23: C2D8 c/b cisplatin rxn with back pain, radiating to chest, hypertensive. Reaction occurred after Cisplatin infusion. Cisplatin d/c'ed 10/19/23: CT CAP: no change in hepatic lesions and RP nodes 10/23/23: C3D1 [place]/Durva alone 10/30/23 C3D8 discussed with pt, concern that Gemzar/Durva alone will not be enough to control disease. Recommendation to add Abraxane. Will d/c Durvalumab as no benefit known with combining with Abraxane.  11/13-1/22/24: C1-3D15 Gemzar/Abraxane 12/28/23: CT C/A/P - stable hepatic lesions and RP LAD 1/29/24: EGD: portal gastropathy 2/5-2/9/14: Admitted to [facility] with melena (2/5 tx held), found to have duodenal variceal bleed s/p obliteration. CT A/P 2/5/24 noted continued liver mass with satellite lesions, difficult to compare to prior scan 2/16/24: US Para: minimal ascites, 50 cc removed c/w portal HTN, neg cytology  3/4/24: resumed Gemzar 800 mg/m2 + Abraxane 100 mg/m2, C4D1 3/18/24: C4D15 4/1/24: C5D1 4/8/24: CT CAP: SD 4/15/24: C5D15 4/29/24: C6D1 5/31/24: CT CAP: incidental RUL segmental PE. Large infiltrative right hepatic lobe mass grossly stable. Multiple satellite nodules in the liver + periportal & retroperitoneal LAD stable. Increasing GB distension w/ increased RUQ & pericholecystic fluid.  5/31/24-6/2/24: Admitted for management of PE & GB distension. Started heparin > eliquis. TTE negative for R heart strain. Per surgery, no surgical intervention, treat w/ ABX  6/3/24: Started Futibatinib 12 mg 6/24/24: Increased Futibatinib 16 mg 7/23/24: futibatinib on hold d/t symptomatic hypotension  7/31/24: resumed futibatinib 12 mg po QD 8/29/24: CT CAP: interval decrease in overall tumor burden with right hepatic lobe, interval decrease in LAD. No new sites of disease 12/2/24: CT CAP : Ill-defined confluent hypodense hepatic lesions within the right hepatic lobe measure smaller compared with the prior exam. A previously visualized 1.0 cm arterially enhancing lesion within segment 3 is not definitively seen. Other scattered small hypodense lesions are not significant changed. Abdominal and retroperitoneal lymphadenopathy is not significantly changed. 3/10/25: CT CAP: Dominant lesion in the right hepatic lobe stable to mildly decreased. New 1 cm subcapsular lesion in segment 7. Small volume upper abdominal/retroperitoneal adenopathy without change 5/14/25: CT CAP: Stable dominant lesion within the right hepatic lobe. Slight increase in segment 7 subcapsular lesion, now measuring up to 1.4 cm. Slight interval increase in adenopathy. [de-identified] : adenocarcinoma, moderately differentiated  [de-identified] : CHIO TMB 1   FGFR2 FGFR2-SORBS1 fusion IDH2 [id number] CBL C416S - subclonal SETD2 [id number] TERT promoter -124C> [FreeTextEntry1] : oh hold  [de-identified] : weight is stable, appetite is fair  HFS improved but neuropathy is still quite bothersome.  difficulty with balance  had consultation with IR today re Y90 to the smaller lesion, plan for procedure in the next few weeks

## 2025-06-04 NOTE — REVIEW OF SYSTEMS
[Diarrhea: Grade 0] : Diarrhea: Grade 0 [Muscle Weakness] : muscle weakness [Difficulty Walking] : difficulty walking [Negative] : Allergic/Immunologic [Skin Rash] : skin rash [de-identified] : + PN

## 2025-06-04 NOTE — PHYSICAL EXAM
[Restricted in physically strenuous activity but ambulatory and able to carry out work of a light or sedentary nature] : Status 1- Restricted in physically strenuous activity but ambulatory and able to carry out work of a light or sedentary nature, e.g., light house work, office work [Normal] : affect appropriate [de-identified] : dryness of hands and feet

## 2025-06-04 NOTE — REASON FOR VISIT
[Telephone (audio)] : This telephonic visit was provided via audio only technology. [Patient preference] : patient preference. [FreeTextEntry1] : liver directed therapy

## 2025-07-14 NOTE — HISTORY OF PRESENT ILLNESS
[de-identified] : 68 M with poor medical follow up, HTN, untreated Hepatitis C presents for management of newly diagnosed cholagiocarcinoma, FGFR2 fusion.   Hong presented to [facility] on 8/10/23 with UTI symptoms, FTT with significant weight loss.  He repors 2-3 mos of progressive weakness, unintentional weight loss of 60 lbs in 2-3 yrs.  CT CAP 8/10/23 showed Large and ill-defined hepatic hypodensity involving a large portion of the right hepatic lobe, retroperitoneal lymphadenopathy. MRI Abd on 8/11/23 with findings are suspicious for multifocal intrahepatic cholangiocarcinoma, with the dominant mass in the right hepatic lobe measuring up to 11.8 cm. Retroperitoneal, periportal and gastric hepatic lymphadenopathy is favored to be metastatic, IR performed liver biopsy 8/14/23 c/w adenocarcinoma, moderately differentiated, IHC stains + CK7,  CK19. Patient found to be + hepatitis C.   PET/CT on 8/22/23  showed FDG-avid dominant lesion in right hepatic lobe and multiple small FDG-avid bilobar hepatic lesions correspond to biopsy-proven malignancy. FDG-avid retroperitoneal and inferior preesophageal lymphadenopathy. FDG-avid focus in upper rectum with questionable corresponding 2 cm soft tissue density on CT, is concerning for neoplasm. Nonspecific, mild hypermetabolism in fundus of stomach may reflect inflammation.   Referred to medical oncology.   9/8/23: Colonoscopy: large rectal polyp resected, benign 9/11/23: C1D1 Gemzar/Cisplatin/Durvalumab  9/18/23: C1D8 10/2/23:C2D1 10/9/23: C2D8 c/b cisplatin rxn with back pain, radiating to chest, hypertensive. Reaction occurred after Cisplatin infusion. Cisplatin d/c'ed 10/19/23: CT CAP: no change in hepatic lesions and RP nodes 10/23/23: C3D1 [place]/Durva alone 10/30/23 C3D8 discussed with pt, concern that Gemzar/Durva alone will not be enough to control disease. Recommendation to add Abraxane. Will d/c Durvalumab as no benefit known with combining with Abraxane.  11/13-1/22/24: C1-3D15 Gemzar/Abraxane 12/28/23: CT C/A/P - stable hepatic lesions and RP LAD 1/29/24: EGD: portal gastropathy 2/5-2/9/14: Admitted to [facility] with melena (2/5 tx held), found to have duodenal variceal bleed s/p obliteration. CT A/P 2/5/24 noted continued liver mass with satellite lesions, difficult to compare to prior scan 2/16/24: US Para: minimal ascites, 50 cc removed c/w portal HTN, neg cytology  3/4/24: resumed Gemzar 800 mg/m2 + Abraxane 100 mg/m2, C4D1 3/18/24: C4D15 4/1/24: C5D1 4/8/24: CT CAP: SD 4/15/24: C5D15 4/29/24: C6D1 5/31/24: CT CAP: incidental RUL segmental PE. Large infiltrative right hepatic lobe mass grossly stable. Multiple satellite nodules in the liver + periportal & retroperitoneal LAD stable. Increasing GB distension w/ increased RUQ & pericholecystic fluid.  5/31/24-6/2/24: Admitted for management of PE & GB distension. Started heparin > eliquis. TTE negative for R heart strain. Per surgery, no surgical intervention, treat w/ ABX  6/3/24: Started Futibatinib 12 mg 6/24/24: Increased Futibatinib 16 mg 7/23/24: futibatinib on hold d/t symptomatic hypotension  7/31/24: resumed futibatinib 12 mg po QD 8/29/24: CT CAP: interval decrease in overall tumor burden with right hepatic lobe, interval decrease in LAD. No new sites of disease 12/2/24: CT CAP : Ill-defined confluent hypodense hepatic lesions within the right hepatic lobe measure smaller compared with the prior exam. A previously visualized 1.0 cm arterially enhancing lesion within segment 3 is not definitively seen. Other scattered small hypodense lesions are not significant changed. Abdominal and retroperitoneal lymphadenopathy is not significantly changed. 3/10/25: CT CAP: Dominant lesion in the right hepatic lobe stable to mildly decreased. New 1 cm subcapsular lesion in segment 7. Small volume upper abdominal/retroperitoneal adenopathy without change 5/14/25: CT CAP: Stable dominant lesion within the right hepatic lobe. Slight increase in segment 7 subcapsular lesion, now measuring up to 1.4 cm. Slight interval increase in adenopathy. [de-identified] : adenocarcinoma, moderately differentiated  [de-identified] : CHIO TMB 1   FGFR2 FGFR2-SORBS1 fusion IDH2 [id number] CBL C416S - subclonal SETD2 [id number] TERT promoter -124C> [FreeTextEntry1] : oh hold

## 2025-07-21 NOTE — HISTORY OF PRESENT ILLNESS
[de-identified] : 68 M with poor medical follow up, HTN, untreated Hepatitis C presents for management of newly diagnosed cholagiocarcinoma, FGFR2 fusion.   Hong presented to [facility] on 8/10/23 with UTI symptoms, FTT with significant weight loss.  He repors 2-3 mos of progressive weakness, unintentional weight loss of 60 lbs in 2-3 yrs.  CT CAP 8/10/23 showed Large and ill-defined hepatic hypodensity involving a large portion of the right hepatic lobe, retroperitoneal lymphadenopathy. MRI Abd on 8/11/23 with findings are suspicious for multifocal intrahepatic cholangiocarcinoma, with the dominant mass in the right hepatic lobe measuring up to 11.8 cm. Retroperitoneal, periportal and gastric hepatic lymphadenopathy is favored to be metastatic, IR performed liver biopsy 8/14/23 c/w adenocarcinoma, moderately differentiated, IHC stains + CK7,  CK19. Patient found to be + hepatitis C.   PET/CT on 8/22/23  showed FDG-avid dominant lesion in right hepatic lobe and multiple small FDG-avid bilobar hepatic lesions correspond to biopsy-proven malignancy. FDG-avid retroperitoneal and inferior preesophageal lymphadenopathy. FDG-avid focus in upper rectum with questionable corresponding 2 cm soft tissue density on CT, is concerning for neoplasm. Nonspecific, mild hypermetabolism in fundus of stomach may reflect inflammation.   Referred to medical oncology.   9/8/23: Colonoscopy: large rectal polyp resected, benign 9/11/23: C1D1 Gemzar/Cisplatin/Durvalumab  9/18/23: C1D8 10/2/23:C2D1 10/9/23: C2D8 c/b cisplatin rxn with back pain, radiating to chest, hypertensive. Reaction occurred after Cisplatin infusion. Cisplatin d/c'ed 10/19/23: CT CAP: no change in hepatic lesions and RP nodes 10/23/23: C3D1 [place]/Durva alone 10/30/23 C3D8 discussed with pt, concern that Gemzar/Durva alone will not be enough to control disease. Recommendation to add Abraxane. Will d/c Durvalumab as no benefit known with combining with Abraxane.  11/13-1/22/24: C1-3D15 Gemzar/Abraxane 12/28/23: CT C/A/P - stable hepatic lesions and RP LAD 1/29/24: EGD: portal gastropathy 2/5-2/9/14: Admitted to [facility] with melena (2/5 tx held), found to have duodenal variceal bleed s/p obliteration. CT A/P 2/5/24 noted continued liver mass with satellite lesions, difficult to compare to prior scan 2/16/24: US Para: minimal ascites, 50 cc removed c/w portal HTN, neg cytology  3/4/24: resumed Gemzar 800 mg/m2 + Abraxane 100 mg/m2, C4D1 3/18/24: C4D15 4/1/24: C5D1 4/8/24: CT CAP: SD 4/15/24: C5D15 4/29/24: C6D1 5/31/24: CT CAP: incidental RUL segmental PE. Large infiltrative right hepatic lobe mass grossly stable. Multiple satellite nodules in the liver + periportal & retroperitoneal LAD stable. Increasing GB distension w/ increased RUQ & pericholecystic fluid.  5/31/24-6/2/24: Admitted for management of PE & GB distension. Started heparin > eliquis. TTE negative for R heart strain. Per surgery, no surgical intervention, treat w/ ABX  6/3/24: Started Futibatinib 12 mg 6/24/24: Increased Futibatinib 16 mg 7/23/24: futibatinib on hold d/t symptomatic hypotension  7/31/24: resumed futibatinib 12 mg po QD 8/29/24: CT CAP: interval decrease in overall tumor burden with right hepatic lobe, interval decrease in LAD. No new sites of disease 12/2/24: CT CAP : Ill-defined confluent hypodense hepatic lesions within the right hepatic lobe measure smaller compared with the prior exam. A previously visualized 1.0 cm arterially enhancing lesion within segment 3 is not definitively seen. Other scattered small hypodense lesions are not significant changed. Abdominal and retroperitoneal lymphadenopathy is not significantly changed. 3/10/25: CT CAP: Dominant lesion in the right hepatic lobe stable to mildly decreased. New 1 cm subcapsular lesion in segment 7. Small volume upper abdominal/retroperitoneal adenopathy without change 5/14/25: CT CAP: Stable dominant lesion within the right hepatic lobe. Slight increase in segment 7 subcapsular lesion, now measuring up to 1.4 cm. Slight interval increase in adenopathy. [de-identified] : adenocarcinoma, moderately differentiated  [de-identified] : CHIO TMB 1   FGFR2 FGFR2-SORBS1 fusion IDH2 [id number] CBL C416S - subclonal SETD2 [id number] TERT promoter -124C> [FreeTextEntry1] : oh hold  [de-identified] : post procedure had rigors without fever a few hours after procedure, resolved by that night has been having diarrhea since starting the pills again  HFS is not bad,but was off the drug for 1 week.  appetite is not very good. weight is stable. not eating much during the day. eating more at night.

## 2025-07-21 NOTE — HISTORY OF PRESENT ILLNESS
[de-identified] : 68 M with poor medical follow up, HTN, untreated Hepatitis C presents for management of newly diagnosed cholagiocarcinoma, FGFR2 fusion.   Hong presented to [facility] on 8/10/23 with UTI symptoms, FTT with significant weight loss.  He repors 2-3 mos of progressive weakness, unintentional weight loss of 60 lbs in 2-3 yrs.  CT CAP 8/10/23 showed Large and ill-defined hepatic hypodensity involving a large portion of the right hepatic lobe, retroperitoneal lymphadenopathy. MRI Abd on 8/11/23 with findings are suspicious for multifocal intrahepatic cholangiocarcinoma, with the dominant mass in the right hepatic lobe measuring up to 11.8 cm. Retroperitoneal, periportal and gastric hepatic lymphadenopathy is favored to be metastatic, IR performed liver biopsy 8/14/23 c/w adenocarcinoma, moderately differentiated, IHC stains + CK7,  CK19. Patient found to be + hepatitis C.   PET/CT on 8/22/23  showed FDG-avid dominant lesion in right hepatic lobe and multiple small FDG-avid bilobar hepatic lesions correspond to biopsy-proven malignancy. FDG-avid retroperitoneal and inferior preesophageal lymphadenopathy. FDG-avid focus in upper rectum with questionable corresponding 2 cm soft tissue density on CT, is concerning for neoplasm. Nonspecific, mild hypermetabolism in fundus of stomach may reflect inflammation.   Referred to medical oncology.   9/8/23: Colonoscopy: large rectal polyp resected, benign 9/11/23: C1D1 Gemzar/Cisplatin/Durvalumab  9/18/23: C1D8 10/2/23:C2D1 10/9/23: C2D8 c/b cisplatin rxn with back pain, radiating to chest, hypertensive. Reaction occurred after Cisplatin infusion. Cisplatin d/c'ed 10/19/23: CT CAP: no change in hepatic lesions and RP nodes 10/23/23: C3D1 [place]/Durva alone 10/30/23 C3D8 discussed with pt, concern that Gemzar/Durva alone will not be enough to control disease. Recommendation to add Abraxane. Will d/c Durvalumab as no benefit known with combining with Abraxane.  11/13-1/22/24: C1-3D15 Gemzar/Abraxane 12/28/23: CT C/A/P - stable hepatic lesions and RP LAD 1/29/24: EGD: portal gastropathy 2/5-2/9/14: Admitted to [facility] with melena (2/5 tx held), found to have duodenal variceal bleed s/p obliteration. CT A/P 2/5/24 noted continued liver mass with satellite lesions, difficult to compare to prior scan 2/16/24: US Para: minimal ascites, 50 cc removed c/w portal HTN, neg cytology  3/4/24: resumed Gemzar 800 mg/m2 + Abraxane 100 mg/m2, C4D1 3/18/24: C4D15 4/1/24: C5D1 4/8/24: CT CAP: SD 4/15/24: C5D15 4/29/24: C6D1 5/31/24: CT CAP: incidental RUL segmental PE. Large infiltrative right hepatic lobe mass grossly stable. Multiple satellite nodules in the liver + periportal & retroperitoneal LAD stable. Increasing GB distension w/ increased RUQ & pericholecystic fluid.  5/31/24-6/2/24: Admitted for management of PE & GB distension. Started heparin > eliquis. TTE negative for R heart strain. Per surgery, no surgical intervention, treat w/ ABX  6/3/24: Started Futibatinib 12 mg 6/24/24: Increased Futibatinib 16 mg 7/23/24: futibatinib on hold d/t symptomatic hypotension  7/31/24: resumed futibatinib 12 mg po QD 8/29/24: CT CAP: interval decrease in overall tumor burden with right hepatic lobe, interval decrease in LAD. No new sites of disease 12/2/24: CT CAP : Ill-defined confluent hypodense hepatic lesions within the right hepatic lobe measure smaller compared with the prior exam. A previously visualized 1.0 cm arterially enhancing lesion within segment 3 is not definitively seen. Other scattered small hypodense lesions are not significant changed. Abdominal and retroperitoneal lymphadenopathy is not significantly changed. 3/10/25: CT CAP: Dominant lesion in the right hepatic lobe stable to mildly decreased. New 1 cm subcapsular lesion in segment 7. Small volume upper abdominal/retroperitoneal adenopathy without change 5/14/25: CT CAP: Stable dominant lesion within the right hepatic lobe. Slight increase in segment 7 subcapsular lesion, now measuring up to 1.4 cm. Slight interval increase in adenopathy. [de-identified] : adenocarcinoma, moderately differentiated  [de-identified] : CHIO TMB 1   FGFR2 FGFR2-SORBS1 fusion IDH2 [id number] CBL C416S - subclonal SETD2 [id number] TERT promoter -124C> [FreeTextEntry1] : oh hold  [de-identified] : post procedure had rigors without fever a few hours after procedure, resolved by that night has been having diarrhea since starting the pills again  HFS is not bad,but was off the drug for 1 week.  appetite is not very good. weight is stable. not eating much during the day. eating more at night.

## 2025-07-21 NOTE — HISTORY OF PRESENT ILLNESS
[de-identified] : 68 M with poor medical follow up, HTN, untreated Hepatitis C presents for management of newly diagnosed cholagiocarcinoma, FGFR2 fusion.   Hong presented to [facility] on 8/10/23 with UTI symptoms, FTT with significant weight loss.  He repors 2-3 mos of progressive weakness, unintentional weight loss of 60 lbs in 2-3 yrs.  CT CAP 8/10/23 showed Large and ill-defined hepatic hypodensity involving a large portion of the right hepatic lobe, retroperitoneal lymphadenopathy. MRI Abd on 8/11/23 with findings are suspicious for multifocal intrahepatic cholangiocarcinoma, with the dominant mass in the right hepatic lobe measuring up to 11.8 cm. Retroperitoneal, periportal and gastric hepatic lymphadenopathy is favored to be metastatic, IR performed liver biopsy 8/14/23 c/w adenocarcinoma, moderately differentiated, IHC stains + CK7,  CK19. Patient found to be + hepatitis C.   PET/CT on 8/22/23  showed FDG-avid dominant lesion in right hepatic lobe and multiple small FDG-avid bilobar hepatic lesions correspond to biopsy-proven malignancy. FDG-avid retroperitoneal and inferior preesophageal lymphadenopathy. FDG-avid focus in upper rectum with questionable corresponding 2 cm soft tissue density on CT, is concerning for neoplasm. Nonspecific, mild hypermetabolism in fundus of stomach may reflect inflammation.   Referred to medical oncology.   9/8/23: Colonoscopy: large rectal polyp resected, benign 9/11/23: C1D1 Gemzar/Cisplatin/Durvalumab  9/18/23: C1D8 10/2/23:C2D1 10/9/23: C2D8 c/b cisplatin rxn with back pain, radiating to chest, hypertensive. Reaction occurred after Cisplatin infusion. Cisplatin d/c'ed 10/19/23: CT CAP: no change in hepatic lesions and RP nodes 10/23/23: C3D1 Bremer/Durva alone 10/30/23 C3D8 discussed with pt, concern that Gemzar/Durva alone will not be enough to control disease. Recommendation to add Abraxane. Will d/c Durvalumab as no benefit known with combining with Abraxane.  11/13-1/22/24: C1-3D15 Gemzar/Abraxane 12/28/23: CT C/A/P - stable hepatic lesions and RP LAD 1/29/24: EGD: portal gastropathy 2/5-2/9/14: Admitted to [facility] with melena (2/5 tx held), found to have duodenal variceal bleed s/p obliteration. CT A/P 2/5/24 noted continued liver mass with satellite lesions, difficult to compare to prior scan 2/16/24: US Para: minimal ascites, 50 cc removed c/w portal HTN, neg cytology  3/4/24: resumed Gemzar 800 mg/m2 + Abraxane 100 mg/m2, C4D1 3/18/24: C4D15 4/1/24: C5D1 4/8/24: CT CAP: SD 4/15/24: C5D15 4/29/24: C6D1 5/31/24: CT CAP: incidental RUL segmental PE. Large infiltrative right hepatic lobe mass grossly stable. Multiple satellite nodules in the liver + periportal & retroperitoneal LAD stable. Increasing GB distension w/ increased RUQ & pericholecystic fluid.  5/31/24-6/2/24: Admitted for management of PE & GB distension. Started heparin > eliquis. TTE negative for R heart strain. Per surgery, no surgical intervention, treat w/ ABX  6/3/24: Started Futibatinib 12 mg 6/24/24: Increased Futibatinib 16 mg 7/23/24: futibatinib on hold d/t symptomatic hypotension  7/31/24: resumed futibatinib 12 mg po QD 8/29/24: CT CAP: interval decrease in overall tumor burden with right hepatic lobe, interval decrease in LAD. No new sites of disease 12/2/24: CT CAP : Ill-defined confluent hypodense hepatic lesions within the right hepatic lobe measure smaller compared with the prior exam. A previously visualized 1.0 cm arterially enhancing lesion within segment 3 is not definitively seen. Other scattered small hypodense lesions are not significant changed. Abdominal and retroperitoneal lymphadenopathy is not significantly changed. 3/10/25: CT CAP: Dominant lesion in the right hepatic lobe stable to mildly decreased. New 1 cm subcapsular lesion in segment 7. Small volume upper abdominal/retroperitoneal adenopathy without change 5/14/25: CT CAP: Stable dominant lesion within the right hepatic lobe. Slight increase in segment 7 subcapsular lesion, now measuring up to 1.4 cm. Slight interval increase in adenopathy. [de-identified] : adenocarcinoma, moderately differentiated  [de-identified] : CHIO TMB 1   FGFR2 FGFR2-SORBS1 fusion IDH2 [id number] CBL C416S - subclonal SETD2 [id number] TERT promoter -124C> [FreeTextEntry1] : oh hold  [de-identified] : post procedure had rigors without fever a few hours after procedure, resolved by that night has been having diarrhea since starting the pills again  HFS is not bad,but was off the drug for 1 week.  appetite is not very good. weight is stable. not eating much during the day. eating more at night.

## 2025-07-21 NOTE — HISTORY OF PRESENT ILLNESS
[de-identified] : 68 M with poor medical follow up, HTN, untreated Hepatitis C presents for management of newly diagnosed cholagiocarcinoma, FGFR2 fusion.   Hong presented to [facility] on 8/10/23 with UTI symptoms, FTT with significant weight loss.  He repors 2-3 mos of progressive weakness, unintentional weight loss of 60 lbs in 2-3 yrs.  CT CAP 8/10/23 showed Large and ill-defined hepatic hypodensity involving a large portion of the right hepatic lobe, retroperitoneal lymphadenopathy. MRI Abd on 8/11/23 with findings are suspicious for multifocal intrahepatic cholangiocarcinoma, with the dominant mass in the right hepatic lobe measuring up to 11.8 cm. Retroperitoneal, periportal and gastric hepatic lymphadenopathy is favored to be metastatic, IR performed liver biopsy 8/14/23 c/w adenocarcinoma, moderately differentiated, IHC stains + CK7,  CK19. Patient found to be + hepatitis C.   PET/CT on 8/22/23  showed FDG-avid dominant lesion in right hepatic lobe and multiple small FDG-avid bilobar hepatic lesions correspond to biopsy-proven malignancy. FDG-avid retroperitoneal and inferior preesophageal lymphadenopathy. FDG-avid focus in upper rectum with questionable corresponding 2 cm soft tissue density on CT, is concerning for neoplasm. Nonspecific, mild hypermetabolism in fundus of stomach may reflect inflammation.   Referred to medical oncology.   9/8/23: Colonoscopy: large rectal polyp resected, benign 9/11/23: C1D1 Gemzar/Cisplatin/Durvalumab  9/18/23: C1D8 10/2/23:C2D1 10/9/23: C2D8 c/b cisplatin rxn with back pain, radiating to chest, hypertensive. Reaction occurred after Cisplatin infusion. Cisplatin d/c'ed 10/19/23: CT CAP: no change in hepatic lesions and RP nodes 10/23/23: C3D1 Waller/Durva alone 10/30/23 C3D8 discussed with pt, concern that Gemzar/Durva alone will not be enough to control disease. Recommendation to add Abraxane. Will d/c Durvalumab as no benefit known with combining with Abraxane.  11/13-1/22/24: C1-3D15 Gemzar/Abraxane 12/28/23: CT C/A/P - stable hepatic lesions and RP LAD 1/29/24: EGD: portal gastropathy 2/5-2/9/14: Admitted to [facility] with melena (2/5 tx held), found to have duodenal variceal bleed s/p obliteration. CT A/P 2/5/24 noted continued liver mass with satellite lesions, difficult to compare to prior scan 2/16/24: US Para: minimal ascites, 50 cc removed c/w portal HTN, neg cytology  3/4/24: resumed Gemzar 800 mg/m2 + Abraxane 100 mg/m2, C4D1 3/18/24: C4D15 4/1/24: C5D1 4/8/24: CT CAP: SD 4/15/24: C5D15 4/29/24: C6D1 5/31/24: CT CAP: incidental RUL segmental PE. Large infiltrative right hepatic lobe mass grossly stable. Multiple satellite nodules in the liver + periportal & retroperitoneal LAD stable. Increasing GB distension w/ increased RUQ & pericholecystic fluid.  5/31/24-6/2/24: Admitted for management of PE & GB distension. Started heparin > eliquis. TTE negative for R heart strain. Per surgery, no surgical intervention, treat w/ ABX  6/3/24: Started Futibatinib 12 mg 6/24/24: Increased Futibatinib 16 mg 7/23/24: futibatinib on hold d/t symptomatic hypotension  7/31/24: resumed futibatinib 12 mg po QD 8/29/24: CT CAP: interval decrease in overall tumor burden with right hepatic lobe, interval decrease in LAD. No new sites of disease 12/2/24: CT CAP : Ill-defined confluent hypodense hepatic lesions within the right hepatic lobe measure smaller compared with the prior exam. A previously visualized 1.0 cm arterially enhancing lesion within segment 3 is not definitively seen. Other scattered small hypodense lesions are not significant changed. Abdominal and retroperitoneal lymphadenopathy is not significantly changed. 3/10/25: CT CAP: Dominant lesion in the right hepatic lobe stable to mildly decreased. New 1 cm subcapsular lesion in segment 7. Small volume upper abdominal/retroperitoneal adenopathy without change 5/14/25: CT CAP: Stable dominant lesion within the right hepatic lobe. Slight increase in segment 7 subcapsular lesion, now measuring up to 1.4 cm. Slight interval increase in adenopathy. [de-identified] : adenocarcinoma, moderately differentiated  [de-identified] : CHIO TMB 1   FGFR2 FGFR2-SORBS1 fusion IDH2 [id number] CBL C416S - subclonal SETD2 [id number] TERT promoter -124C> [FreeTextEntry1] : oh hold  [de-identified] : post procedure had rigors without fever a few hours after procedure, resolved by that night has been having diarrhea since starting the pills again  HFS is not bad,but was off the drug for 1 week.  appetite is not very good. weight is stable. not eating much during the day. eating more at night.

## 2025-07-30 NOTE — PLAN
[TextEntry] : PLAN # Chronic Hepatitis C: - I have discussed with him at length regarding hepatitis C. I reviewed the natural history, modes of transmission, evaluation, prognosis, and possible treatment. Genotype 2.  -Given improvements in his recent imaging studies, we started Epclusa for 24 week treatment at the end of Sept 2024; he is set to complete treatment within the next 2 weeks. Will plan for repeat labs with Hep C RNA today and in 3 months before follow up.   #Variceal Screening/Duodenal ulcers - Status post EGD on 5/20/2024: No endoscopic signs of esophagitis, ulcerations, or varices throughout the esophagus. Mild portal hypertensive gastropathy was observed in the gastric fundus and gastric body. A large varix (over 5 mm) was found in the proximal second portion of the duodenum, measuring 6 mm in diameter. A distal second varix, treated with glue injection, appears obliterated with no residual ulceration.   - Schedule a repeat EGD next year.  # Ascites: -Serum-ascites albumin gradient (SAAG) indicates portal hypertension. Ascitic fluid negative for malignant cells (Feb 16, 2024). Mr. KEARNS was counseled to adhere to a low sodium (<2 grams of sodium per day) diet by: avoiding adding any salt to meals (removing the salt shaker from the table); eliminating salty foods from his diet; eating more home-cooked meals; choosing fresh or frozen, not canned, vegetables and fruits; and reading ingredient and food labels to choose low sodium foods. -Patient reports no recurrence of ascites and reports being off of all diuretics. Advised to take Furosemide 20mg daily PRN.  #Hypotension -resolved; patient reports being off of Midodrine.   #Hepatic Encephalopathy -Patient is still taking Lactulose after last admission, HE symptoms have improved.  -We reviewed that the patient can self titrate dosing of Lactulose based on constipation symptoms, etc. He reports also taking Miralax and Colace to help treat constipation as well.   #Constipation -Continue Miralax / lactulose   # Multifocal Cholangiocarcinoma with metastases: -Management of the cholangiocarcinoma will be driven Dr. Kiser, Medical Oncologist.  -The patient received a combination of Gemzar, Cisplatin, and Durvalumab initially, followed by Gemzar/Durvalumab alone, and later Gemzar/Abraxane due to concerns about disease control. Durvalumab was discontinued, and stable hepatic lesions were observed on subsequent scans, though a duodenal variceal bleed occurred during treatment, and now treatment plan is being re-assessed. Further he developed ascites, s/p LVP x 1. Ascites now resolved and he is off diuretics. He will follow up with Dr. Kiser with regards to CCA management. He is scheduled for repeat imaging next week.  -He was started on Entecavir to protect against HBV reactivation due to HepB COREAb positive - this dose was increased due to previous decompensation and he is currently taking 1mg daily.   RTC in 3 months with repeat labs today and before RPA.   The patient was examined, and the treatment plan was reviewed in consultation with Dr. Mao.  Bushra Hensley, MSN, FNP-BC Transplant Hepatology Nurse Practitioner [facility] [address] [place] [address] T: [phone] | F: [phone].

## 2025-07-30 NOTE — HISTORY OF PRESENT ILLNESS
[FreeTextEntry1] : Mr. Hong Gonzalez, a 69-year-old male, presents for hepatology follow-up.  He has a history of inconsistent medical care, hypertension, and untreated hepatitis C virus (HCV) infection, with a viral load of 148,144 IU/mL incidentally detected during a workup for suspected cholangiocarcinoma. His wife has a history of treated and cured HCV.  Mr. Gonzalez initially presented to the hospital in August 2023 with symptoms suggestive of a urinary tract infection, significant weight loss, and failure to thrive.  He reported a progressive decline in his overall health over the preceding 2-3 months and unintentional weight loss of approximately 60 pounds over the past 2-3 years. Imaging revealed a large, ill-defined hypodensity in the right hepatic lobe and retroperitoneal lymphadenopathy.  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  A liver biopsy confirmed moderately differentiated adenocarcinoma, positive for CK7 and CK19.  PET/CT confirmed the hepatic lesions and lymphadenopathy, also identifying a concerning area in the rectum.  A flex sigmoidoscopy revealed rectal polyps; pathology is pending, and a colonoscopy is planned.  His alpha-fetoprotein (AFP) level at that time was <1.8 ng/mL.  Between October and December 2023, imaging showed stable disease.  An esophagogastroduodenoscopy (EGD) revealed portal hypertensive gastropathy and two small duodenal nodules; biopsy of one resulted in delayed hematemesis and melena.  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  Despite this, new-onset ascites developed, requiring large-volume paracentesis.  Diuretic therapy (furosemide and spironolactone) was initiated.    Throughout early 2024, Mr. Gonzalez experienced intermittent abdominal pain and nausea, which resolved.  His ascites responded to diuretics.  He resumed chemotherapy with gemcitabine and abraxane at reduced doses due to fatigue and portal hypertension. He uses medical marijuana for nausea.  Liver function tests remained mildly elevated. An EGD was postponed due to concurrent chemotherapy scheduling.  In June 2024, he was hospitalized for suspected acute cholecystitis, which proved negative. However, a pulmonary embolism was diagnosed and treated with anticoagulation.  Hepatic encephalopathy was also noted during this admission, and lactulose was initiated.  He transitioned to oral chemotherapy with futibatinib, which exacerbated constipation.  In July 2024, Mr. Gonzalez reported nausea and decreased appetite, potentially related to midodrine and a phosphate binder started for hypotension.  By August 2024, his hypotension had resolved, and his CA 19-9 was trending downward.  He continued diuretics and a reduced dose of midodrine.  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  He also expressed interest in HCV treatment.  He initiated Epclusa for HCV in October 2024 and tolerated it well.  By November 2024, he felt well overall, and liver tests had normalized, with improvement in CA 19-9.  As of March 2025, he reported improvement in blood pressure and ascites, leading to discontinuation of midodrine and furosemide. He is nearing completion of HCV therapy and has upcoming repeat imaging.  Sustained virologic response (SVR) labs are planned.  The patient returns today for a routine follow-up, accompanied by his daughter. He reports feeling well. He completed HCV therapy in March 2025 and is scheduled for SVR labs today. He underwent Y90 treatment on July 17 due to an increase in the segment 7 lesion, which now measures up to 1.4 cm according to the May 2025 CT abdomen results.